# Patient Record
Sex: MALE | Race: WHITE
[De-identification: names, ages, dates, MRNs, and addresses within clinical notes are randomized per-mention and may not be internally consistent; named-entity substitution may affect disease eponyms.]

---

## 2020-02-12 ENCOUNTER — HOSPITAL ENCOUNTER (INPATIENT)
Dept: HOSPITAL 72 - EMR | Age: 85
LOS: 5 days | Discharge: NURSING, REHAB OR LONG-TERM CARE | DRG: 871 | End: 2020-02-17
Payer: MEDICARE

## 2020-02-12 VITALS — DIASTOLIC BLOOD PRESSURE: 54 MMHG | SYSTOLIC BLOOD PRESSURE: 96 MMHG

## 2020-02-12 VITALS — DIASTOLIC BLOOD PRESSURE: 48 MMHG | SYSTOLIC BLOOD PRESSURE: 98 MMHG

## 2020-02-12 VITALS — SYSTOLIC BLOOD PRESSURE: 112 MMHG | DIASTOLIC BLOOD PRESSURE: 54 MMHG

## 2020-02-12 VITALS — DIASTOLIC BLOOD PRESSURE: 43 MMHG | SYSTOLIC BLOOD PRESSURE: 100 MMHG

## 2020-02-12 VITALS — SYSTOLIC BLOOD PRESSURE: 88 MMHG | DIASTOLIC BLOOD PRESSURE: 42 MMHG

## 2020-02-12 VITALS — DIASTOLIC BLOOD PRESSURE: 41 MMHG | SYSTOLIC BLOOD PRESSURE: 85 MMHG

## 2020-02-12 VITALS — SYSTOLIC BLOOD PRESSURE: 125 MMHG | DIASTOLIC BLOOD PRESSURE: 75 MMHG

## 2020-02-12 VITALS — SYSTOLIC BLOOD PRESSURE: 101 MMHG | DIASTOLIC BLOOD PRESSURE: 48 MMHG

## 2020-02-12 VITALS — HEIGHT: 71 IN | BODY MASS INDEX: 28 KG/M2 | WEIGHT: 200 LBS

## 2020-02-12 VITALS — SYSTOLIC BLOOD PRESSURE: 90 MMHG | DIASTOLIC BLOOD PRESSURE: 59 MMHG

## 2020-02-12 VITALS — SYSTOLIC BLOOD PRESSURE: 92 MMHG | DIASTOLIC BLOOD PRESSURE: 50 MMHG

## 2020-02-12 VITALS — DIASTOLIC BLOOD PRESSURE: 40 MMHG | SYSTOLIC BLOOD PRESSURE: 98 MMHG

## 2020-02-12 VITALS — DIASTOLIC BLOOD PRESSURE: 39 MMHG | SYSTOLIC BLOOD PRESSURE: 91 MMHG

## 2020-02-12 VITALS — DIASTOLIC BLOOD PRESSURE: 51 MMHG | SYSTOLIC BLOOD PRESSURE: 102 MMHG

## 2020-02-12 VITALS — DIASTOLIC BLOOD PRESSURE: 65 MMHG | SYSTOLIC BLOOD PRESSURE: 115 MMHG

## 2020-02-12 VITALS — SYSTOLIC BLOOD PRESSURE: 93 MMHG | DIASTOLIC BLOOD PRESSURE: 50 MMHG

## 2020-02-12 VITALS — DIASTOLIC BLOOD PRESSURE: 65 MMHG | SYSTOLIC BLOOD PRESSURE: 112 MMHG

## 2020-02-12 VITALS — SYSTOLIC BLOOD PRESSURE: 109 MMHG | DIASTOLIC BLOOD PRESSURE: 44 MMHG

## 2020-02-12 VITALS — SYSTOLIC BLOOD PRESSURE: 110 MMHG | DIASTOLIC BLOOD PRESSURE: 50 MMHG

## 2020-02-12 VITALS — SYSTOLIC BLOOD PRESSURE: 84 MMHG | DIASTOLIC BLOOD PRESSURE: 43 MMHG

## 2020-02-12 DIAGNOSIS — N17.9: ICD-10-CM

## 2020-02-12 DIAGNOSIS — E86.0: ICD-10-CM

## 2020-02-12 DIAGNOSIS — I49.5: ICD-10-CM

## 2020-02-12 DIAGNOSIS — J44.0: ICD-10-CM

## 2020-02-12 DIAGNOSIS — N18.9: ICD-10-CM

## 2020-02-12 DIAGNOSIS — I21.A1: ICD-10-CM

## 2020-02-12 DIAGNOSIS — Z95.0: ICD-10-CM

## 2020-02-12 DIAGNOSIS — A41.9: Primary | ICD-10-CM

## 2020-02-12 DIAGNOSIS — J96.01: ICD-10-CM

## 2020-02-12 DIAGNOSIS — Z95.5: ICD-10-CM

## 2020-02-12 DIAGNOSIS — I25.10: ICD-10-CM

## 2020-02-12 DIAGNOSIS — Z95.1: ICD-10-CM

## 2020-02-12 DIAGNOSIS — J15.1: ICD-10-CM

## 2020-02-12 DIAGNOSIS — R62.7: ICD-10-CM

## 2020-02-12 DIAGNOSIS — F03.90: ICD-10-CM

## 2020-02-12 DIAGNOSIS — E11.22: ICD-10-CM

## 2020-02-12 DIAGNOSIS — I13.10: ICD-10-CM

## 2020-02-12 DIAGNOSIS — J44.1: ICD-10-CM

## 2020-02-12 DIAGNOSIS — D64.9: ICD-10-CM

## 2020-02-12 DIAGNOSIS — I73.9: ICD-10-CM

## 2020-02-12 DIAGNOSIS — R63.0: ICD-10-CM

## 2020-02-12 DIAGNOSIS — R13.10: ICD-10-CM

## 2020-02-12 LAB
ADD MANUAL DIFF: YES
ADD MANUAL DIFF: YES
ALBUMIN SERPL-MCNC: 2.3 G/DL (ref 3.4–5)
ALBUMIN/GLOB SERPL: 0.5 {RATIO} (ref 1–2.7)
ALP SERPL-CCNC: 104 U/L (ref 46–116)
ALT SERPL-CCNC: 19 U/L (ref 12–78)
ANION GAP SERPL CALC-SCNC: 6 MMOL/L (ref 5–15)
ANION GAP SERPL CALC-SCNC: 9 MMOL/L (ref 5–15)
APPEARANCE UR: CLEAR
APTT PPP: YELLOW S
AST SERPL-CCNC: 21 U/L (ref 15–37)
BILIRUB SERPL-MCNC: 0.5 MG/DL (ref 0.2–1)
BUN SERPL-MCNC: 39 MG/DL (ref 7–18)
BUN SERPL-MCNC: 43 MG/DL (ref 7–18)
CALCIUM SERPL-MCNC: 8.3 MG/DL (ref 8.5–10.1)
CALCIUM SERPL-MCNC: 8.8 MG/DL (ref 8.5–10.1)
CHLORIDE SERPL-SCNC: 105 MMOL/L (ref 98–107)
CHLORIDE SERPL-SCNC: 108 MMOL/L (ref 98–107)
CO2 SERPL-SCNC: 23 MMOL/L (ref 21–32)
CO2 SERPL-SCNC: 29 MMOL/L (ref 21–32)
CREAT SERPL-MCNC: 2.2 MG/DL (ref 0.55–1.3)
CREAT SERPL-MCNC: 2.8 MG/DL (ref 0.55–1.3)
ERYTHROCYTE [DISTWIDTH] IN BLOOD BY AUTOMATED COUNT: 16.4 % (ref 11.6–14.8)
ERYTHROCYTE [DISTWIDTH] IN BLOOD BY AUTOMATED COUNT: 16.8 % (ref 11.6–14.8)
GLOBULIN SER-MCNC: 4.8 G/DL
GLUCOSE UR STRIP-MCNC: NEGATIVE MG/DL
HCT VFR BLD CALC: 32.3 % (ref 42–52)
HCT VFR BLD CALC: 34.5 % (ref 42–52)
HGB BLD-MCNC: 10.2 G/DL (ref 14.2–18)
HGB BLD-MCNC: 11.6 G/DL (ref 14.2–18)
KETONES UR QL STRIP: (no result)
LEUKOCYTE ESTERASE UR QL STRIP: NEGATIVE
MCV RBC AUTO: 92 FL (ref 80–99)
MCV RBC AUTO: 94 FL (ref 80–99)
NITRITE UR QL STRIP: NEGATIVE
PH UR STRIP: 5 [PH] (ref 4.5–8)
PLATELET # BLD: 174 K/UL (ref 150–450)
PLATELET # BLD: 247 K/UL (ref 150–450)
POTASSIUM SERPL-SCNC: 4.4 MMOL/L (ref 3.5–5.1)
POTASSIUM SERPL-SCNC: 4.5 MMOL/L (ref 3.5–5.1)
PROT UR QL STRIP: (no result)
RBC # BLD AUTO: 3.44 M/UL (ref 4.7–6.1)
RBC # BLD AUTO: 3.73 M/UL (ref 4.7–6.1)
SODIUM SERPL-SCNC: 140 MMOL/L (ref 136–145)
SODIUM SERPL-SCNC: 140 MMOL/L (ref 136–145)
SP GR UR STRIP: 1.02 (ref 1–1.03)
UROBILINOGEN UR-MCNC: NORMAL MG/DL (ref 0–1)
WBC # BLD AUTO: 13.2 K/UL (ref 4.8–10.8)
WBC # BLD AUTO: 19.9 K/UL (ref 4.8–10.8)

## 2020-02-12 PROCEDURE — 85007 BL SMEAR W/DIFF WBC COUNT: CPT

## 2020-02-12 PROCEDURE — 87040 BLOOD CULTURE FOR BACTERIA: CPT

## 2020-02-12 PROCEDURE — 82962 GLUCOSE BLOOD TEST: CPT

## 2020-02-12 PROCEDURE — 71045 X-RAY EXAM CHEST 1 VIEW: CPT

## 2020-02-12 PROCEDURE — 82803 BLOOD GASES ANY COMBINATION: CPT

## 2020-02-12 PROCEDURE — 84484 ASSAY OF TROPONIN QUANT: CPT

## 2020-02-12 PROCEDURE — 87181 SC STD AGAR DILUTION PER AGT: CPT

## 2020-02-12 PROCEDURE — 87070 CULTURE OTHR SPECIMN AEROBIC: CPT

## 2020-02-12 PROCEDURE — 96368 THER/DIAG CONCURRENT INF: CPT

## 2020-02-12 PROCEDURE — 93306 TTE W/DOPPLER COMPLETE: CPT

## 2020-02-12 PROCEDURE — 93970 EXTREMITY STUDY: CPT

## 2020-02-12 PROCEDURE — 85025 COMPLETE CBC W/AUTO DIFF WBC: CPT

## 2020-02-12 PROCEDURE — 70450 CT HEAD/BRAIN W/O DYE: CPT

## 2020-02-12 PROCEDURE — 71250 CT THORAX DX C-: CPT

## 2020-02-12 PROCEDURE — 87205 SMEAR GRAM STAIN: CPT

## 2020-02-12 PROCEDURE — 82140 ASSAY OF AMMONIA: CPT

## 2020-02-12 PROCEDURE — 80048 BASIC METABOLIC PNL TOTAL CA: CPT

## 2020-02-12 PROCEDURE — 36415 COLL VENOUS BLD VENIPUNCTURE: CPT

## 2020-02-12 PROCEDURE — 86710 INFLUENZA VIRUS ANTIBODY: CPT

## 2020-02-12 PROCEDURE — 80061 LIPID PANEL: CPT

## 2020-02-12 PROCEDURE — 78580 LUNG PERFUSION IMAGING: CPT

## 2020-02-12 PROCEDURE — 96375 TX/PRO/DX INJ NEW DRUG ADDON: CPT

## 2020-02-12 PROCEDURE — 36600 WITHDRAWAL OF ARTERIAL BLOOD: CPT

## 2020-02-12 PROCEDURE — 78579 LUNG VENTILATION IMAGING: CPT

## 2020-02-12 PROCEDURE — 99285 EMERGENCY DEPT VISIT HI MDM: CPT

## 2020-02-12 PROCEDURE — 80053 COMPREHEN METABOLIC PANEL: CPT

## 2020-02-12 PROCEDURE — 72131 CT LUMBAR SPINE W/O DYE: CPT

## 2020-02-12 PROCEDURE — 94640 AIRWAY INHALATION TREATMENT: CPT

## 2020-02-12 PROCEDURE — 85379 FIBRIN DEGRADATION QUANT: CPT

## 2020-02-12 PROCEDURE — 74018 RADEX ABDOMEN 1 VIEW: CPT

## 2020-02-12 PROCEDURE — 81003 URINALYSIS AUTO W/O SCOPE: CPT

## 2020-02-12 PROCEDURE — 83880 ASSAY OF NATRIURETIC PEPTIDE: CPT

## 2020-02-12 PROCEDURE — 96365 THER/PROPH/DIAG IV INF INIT: CPT

## 2020-02-12 PROCEDURE — 72192 CT PELVIS W/O DYE: CPT

## 2020-02-12 PROCEDURE — 87081 CULTURE SCREEN ONLY: CPT

## 2020-02-12 PROCEDURE — 93005 ELECTROCARDIOGRAM TRACING: CPT

## 2020-02-12 RX ADMIN — INSULIN ASPART SCH UNITS: 100 INJECTION, SOLUTION INTRAVENOUS; SUBCUTANEOUS at 11:30

## 2020-02-12 RX ADMIN — IPRATROPIUM BROMIDE AND ALBUTEROL SULFATE SCH ML: .5; 3 SOLUTION RESPIRATORY (INHALATION) at 13:00

## 2020-02-12 RX ADMIN — IPRATROPIUM BROMIDE AND ALBUTEROL SULFATE SCH ML: .5; 3 SOLUTION RESPIRATORY (INHALATION) at 19:19

## 2020-02-12 RX ADMIN — ATORVASTATIN CALCIUM SCH MG: 20 TABLET, FILM COATED ORAL at 21:00

## 2020-02-12 RX ADMIN — DEXTROSE MONOHYDRATE SCH MLS/HR: 50 INJECTION, SOLUTION INTRAVENOUS at 17:19

## 2020-02-12 RX ADMIN — HEPARIN SODIUM SCH UNITS: 5000 INJECTION INTRAVENOUS; SUBCUTANEOUS at 10:09

## 2020-02-12 RX ADMIN — DIVALPROEX SODIUM SCH MG: 125 CAPSULE ORAL at 18:00

## 2020-02-12 RX ADMIN — DOCUSATE SODIUM SCH MG: 100 CAPSULE, LIQUID FILLED ORAL at 18:00

## 2020-02-12 RX ADMIN — METHYLPREDNISOLONE SODIUM SUCCINATE SCH MG: 40 INJECTION, POWDER, LYOPHILIZED, FOR SOLUTION INTRAMUSCULAR; INTRAVENOUS at 21:31

## 2020-02-12 RX ADMIN — INSULIN ASPART SCH UNITS: 100 INJECTION, SOLUTION INTRAVENOUS; SUBCUTANEOUS at 21:00

## 2020-02-12 RX ADMIN — DOCUSATE SODIUM SCH MG: 100 CAPSULE, LIQUID FILLED ORAL at 13:00

## 2020-02-12 RX ADMIN — INSULIN ASPART SCH UNITS: 100 INJECTION, SOLUTION INTRAVENOUS; SUBCUTANEOUS at 17:21

## 2020-02-12 RX ADMIN — HEPARIN SODIUM SCH UNITS: 5000 INJECTION INTRAVENOUS; SUBCUTANEOUS at 21:34

## 2020-02-12 NOTE — NUR
HAND-OFF: 

Patient monitored closely throughout this shift. SBP's improved now 115's to 120's. O2 sat 
96-99%. Patient remained lethargic but now able to open eyes briefly and withdraws to pain. 
Son Wally updated re: patients condition and stated he wanted everything done for his 
father. Report given to Donald Martines. Plan of care endorsed.

## 2020-02-12 NOTE — NUR
NURSE NOTES:

Received report from Donald Naik. PAtient arrived to the floor @0800 transported via gurney 
with 2 Staff. PAtient is very lethargic arousable to Deep pain only. Mouth breathing with 
simple mask at 7 L/min. Noted with scattered ronchi/crackles all throughout. Telemetry 
monitor place. Vital signs obtained SBP low 90's. Patient positioned for comfort and call 
placed to DR. Hardwick. Fall precautions in place.

## 2020-02-12 NOTE — DIAGNOSTIC IMAGING REPORT
Indication: Shortness of breath

 

Technique: One view of the chest

 

Comparison: none

 

Findings: There is a left chest pacemaker. There are median sternotomy sutures. The

left lateral hemidiaphragm is obscured. The heart is upper limits normal in size.

Area of sclerosis is seen in the proximal right humerus.

 

Impression: The posterior left lateral hemidiaphragm, parenchymal and/or pleural

disease of the left lung base possible.

 

No acute process otherwise

 

Sclerotic lesion of the proximal humerus, most likely a bone island, less likely

osteoblastic metastasis

## 2020-02-12 NOTE — NUR
NURSE NOTES:

Dr. Gonzalez made aware of ST recommendations to insert NGT. MD agreed recommendations.

## 2020-02-12 NOTE — NUR
NURSE NOTES:

Dr. Aguirre in to see patient. order received STAT ABG. resulted messaged left to MD. Will 
monitor

## 2020-02-12 NOTE — NUR
NURSE NOTES:

Dr. Fuentes seen patient aware patients B/P new ordered obned to give addiotional dose of 500 
ml IV bolus of saline. B/p Improving. Will follow.

## 2020-02-12 NOTE — NUR
NURSE NOTES:

Per Doctor Ronen he spoke with doctor cruz and KELLY coon will placed NGT in MD. will 
follow

## 2020-02-12 NOTE — CARDIAC ELECTROPHYSIOLOGY PN
Subjective


Subjective


[id number]





Objective





Last 24 Hour Vital Signs








  Date Time  Temp Pulse Resp B/P (MAP) Pulse Ox O2 Delivery O2 Flow Rate FiO2


 


2/12/20 08:05 98.5 79 21 112/54 98 Simple Mask 7.0 


 


2/12/20 07:30 98.5 79 20 112/54 98 Simple Mask 7.0 


 


2/12/20 06:39 98.5 78 20 109/44 100 Simple Mask 15.0 


 


2/12/20 04:00 99.1 99 22 110/50 97 Non-Rebreather 15.0 


 


2/12/20 04:00  99 22   Non-Rebreather 15.0 


 


2/12/20 03:52 98.8 99 22 110/50 (70) 83 Room Air  

















Intake and Output  


 


 2/11/20 2/12/20





 19:00 07:00


 


Intake Total  2660 ml


 


Balance  2660 ml


 


  


 


Intake IV Total  2660 ml


 


# Voids  1











Laboratory Tests








Test


  2/12/20


04:08 2/12/20


04:24 2/12/20


04:44


 


Arterial Blood pH


  7.470


(7.350-7.450) 


  


 


 


Arterial Blood Partial


Pressure CO2 30.2 mmHg


(35.0-45.0)  L 


  


 


 


Arterial Blood Partial


Pressure O2 120.3 mmHg


(75.0-100.0)  H 


  


 


 


Arterial Blood HCO3


  21.6 mmol/L


(22.0-26.0)  L 


  


 


 


Arterial Blood Oxygen


Saturation 98.5 %


() 


  


 


 


Arterial Blood Base Excess -1.2 (-2-2)    


 


Shawn Test Positive    


 


White Blood Count


  


  19.9 K/UL


(4.8-10.8)  H 


 


 


Red Blood Count


  


  3.73 M/UL


(4.70-6.10)  L 


 


 


Hemoglobin


  


  11.6 G/DL


(14.2-18.0)  L 


 


 


Hematocrit


  


  34.5 %


(42.0-52.0)  L 


 


 


Mean Corpuscular Volume  92 FL (80-99)   


 


Mean Corpuscular Hemoglobin


  


  31.2 PG


(27.0-31.0)  H 


 


 


Mean Corpuscular Hemoglobin


Concent 


  33.7 G/DL


(32.0-36.0) 


 


 


Red Cell Distribution Width


  


  16.4 %


(11.6-14.8)  H 


 


 


Platelet Count


  


  247 K/UL


(150-450) 


 


 


Mean Platelet Volume


  


  5.5 FL


(6.5-10.1)  L 


 


 


Neutrophils (%) (Auto)


  


  % (45.0-75.0)


  


 


 


Lymphocytes (%) (Auto)


  


  % (20.0-45.0)


  


 


 


Monocytes (%) (Auto)   % (1.0-10.0)   


 


Eosinophils (%) (Auto)   % (0.0-3.0)   


 


Basophils (%) (Auto)   % (0.0-2.0)   


 


Differential Total Cells


Counted 


  100  


  


 


 


Neutrophils % (Manual)  80 % (45-75)  H 


 


Lymphocytes % (Manual)  15 % (20-45)  L 


 


Monocytes % (Manual)  5 % (1-10)   


 


Eosinophils % (Manual)  0 % (0-3)   


 


Basophils % (Manual)  0 % (0-2)   


 


Band Neutrophils  0 % (0-8)   


 


Platelet Estimate  Adequate   


 


Platelet Morphology  Normal   


 


Sodium Level


  


  140 MMOL/L


(136-145) 


 


 


Potassium Level


  


  4.5 MMOL/L


(3.5-5.1) 


 


 


Chloride Level


  


  105 MMOL/L


() 


 


 


Carbon Dioxide Level


  


  29 MMOL/L


(21-32) 


 


 


Anion Gap


  


  6 mmol/L


(5-15) 


 


 


Blood Urea Nitrogen


  


  39 mg/dL


(7-18)  H 


 


 


Creatinine


  


  2.8 MG/DL


(0.55-1.30)  H 


 


 


Estimat Glomerular Filtration


Rate 


  21.4 mL/min


(>60) 


 


 


Glucose Level


  


  127 MG/DL


()  H 


 


 


Calcium Level


  


  8.8 MG/DL


(8.5-10.1) 


 


 


Total Bilirubin


  


  0.5 MG/DL


(0.2-1.0) 


 


 


Aspartate Amino Transf


(AST/SGOT) 


  21 U/L (15-37)


  


 


 


Alanine Aminotransferase


(ALT/SGPT) 


  19 U/L (12-78)


  


 


 


Alkaline Phosphatase


  


  104 U/L


() 


 


 


Troponin I


  


  0.066 ng/mL


(0.000-0.056) 


 


 


Pro-B-Type Natriuretic Peptide


  


  2241 pg/mL


(0-125)  H 


 


 


Total Protein


  


  7.1 G/DL


(6.4-8.2) 


 


 


Albumin


  


  2.3 G/DL


(3.4-5.0)  L 


 


 


Globulin  4.8 g/dL   


 


Albumin/Globulin Ratio


  


  0.5 (1.0-2.7)


L 


 


 


Urine Color   Yellow  


 


Urine Appearance   Clear  


 


Urine pH   5 (4.5-8.0)  


 


Urine Specific Gravity


  


  


  1.020


(1.005-1.035)


 


Urine Protein


  


  


  2+ (NEGATIVE)


H


 


Urine Glucose (UA)


  


  


  Negative


(NEGATIVE)


 


Urine Ketones


  


  


  1+ (NEGATIVE)


H


 


Urine Blood


  


  


  1+ (NEGATIVE)


H


 


Urine Nitrite


  


  


  Negative


(NEGATIVE)


 


Urine Bilirubin


  


  


  1+ (NEGATIVE)


H


 


Urine Ictotest


  


  


  Negative


(NEGATIVE)


 


Urine Urobilinogen


  


  


  Normal MG/DL


(0.0-1.0)


 


Urine Leukocyte Esterase


  


  


  Negative


(NEGATIVE)


 


Urine RBC


  


  


  0-2 /HPF (0 -


0)  H


 


Urine WBC


  


  


  0 /HPF (0 - 0)


 


 


Urine Squamous Epithelial


Cells 


  


  Few /LPF


(NONE/OCC)


 


Urine Bacteria


  


  


  None /HPF


(NONE)











Microbiology








 Date/Time


Source Procedure


Growth Status


 


 


 2/12/20 04:24


Nasal Nares - Final Complete


 


 2/12/20 04:24


Nasal Nares - Final Complete


 


 2/12/20 04:00


Rectum  Received

















Bob Fuentes MD Feb 12, 2020 09:40

## 2020-02-12 NOTE — NUR
NURSE NOTES:

Dr. Hardwick in to see patient admission orders obtained and carried out. Md aware patient 
mental status and stated it moght be due to the ativan that he received from ER. 250ml 
saline bolus given as ordered by MD.

## 2020-02-12 NOTE — NUR
CASE MANAGEMENT:REVIEW



89YR OLD MALE BIBA FROM [facility]



CC: SOB. 



SI: RESPIRATORY FAILURE. JOAQUIN

99.1   99  22  109/44  83% ON RA

WBC+19.9    BUN+39   CR+2.8   TROPONIN(+) 0.066





IS: PLACED ON NON REBREATHER

IV SOLUMEDROL 

1L NS BOLUS

IV ROCEPHIN 

IV ATIVAN

CHEST XRAY

**: TO TELEMETRY 

DCP: FROM [facility]



**interqual criteria met

## 2020-02-12 NOTE — CONSULTATION
DATE OF CONSULTATION:  02/12/2020

PULMONARY CONSULTATION



CONSULTING PHYSICIAN:  Mika Aguirre M.D.



REFERRING PHYSICIAN:  Kandy Hardwick M.D.



REASON FOR CONSULTATION:  COPD exacerbation.



HISTORY OF PRESENT ILLNESS:  The patient is an 89-year-old male with a

presumed past medical history of COPD, dementia, arrhythmia, sick sinus

syndrome and pacemaker, and recent admission at [facility] with sepsis

and possible pneumonia, nonverbal at baseline with severe dementia, sent

in from the facility with hypoxemia and low O2 sats.  He was 83% on room

air in the ER with leukocytosis.  Chest x-ray did not show any acute

abnormality.  He was treated with Rocephin and azithromycin and admitted.

At the time of my history, the patient is obtunded, but per nurse he

received Ativan in the ER and was more responsive earlier.  An ABG is

pending.



PAST MEDICAL HISTORY:

1. COPD.

2. Dementia.

3. Arrhythmia.

4. Sick sinus syndrome, status post pacemaker.



PAST SURGICAL HISTORY:  Pacemaker placement, otherwise unknown.



SOCIAL HISTORY:  Retired, nursing home resident.  No known tobacco,

alcohol, or drug use.



ALLERGIES:  Acetaminophen and hydrocodone.



MEDICATIONS:  Prior to admission medications, reviewed.  Current

medications, reviewed.



REVIEW OF SYSTEMS:  Unobtainable.



PHYSICAL EXAMINATION:

VITAL SIGNS:  Temperature is 98.8, pulse 85, blood pressure 98/54, and

respiratory rate 16.  Saturating 98% on 8 L.

GENERAL:  He is an elderly frail gentleman, in no acute distress,

nonverbal.

HEENT:  Normocephalic and atraumatic.  Oropharynx is clear.  Moist mucous

membranes.

NECK:  Supple without lymphadenopathy.

CHEST:  Coarse breath sounds.  Distant.

HEART:  Regular rate and rhythm.

ABDOMEN:  Soft, nontender, and nondistended.

EXTREMITIES:  No cyanosis clubbing, or edema.



ANCILLARY DATA:  White count 13.2, hemoglobin 10, and platelet count

174,000.  ABG - 7.33/39/49/20/82.  Sodium 140, potassium 4.4, chloride

108, bicarbonate 23, BUN 42, and creatinine 2.2.  Glucose 217.  Calcium

8.3.  Troponin 0.06 and 0.08.  BNP 1688.  Total protein 7.1, albumin 2.3,

globulin 4.8.  Please note that creatinine was 1 on 01/30/2020 at

[facility].



Last echocardiogram on 11/02/2019 at [facility] showed LVEF of 38%

with mild diastolic dysfunction.



IMAGING:  Chest x-ray showed some atelectasis at the bases, bony findings,

and the senescent changes.



CT of the chest without contrast showed bilateral basilar infiltrates

and atelectasis.  Mild congestion.  Old granulomatous disease.  Pacemaker.

Prior sternotomy.  Cholelithiasis.  Constipation.



ASSESSMENT:  The patient is an 89-year-old male with a history of dementia,

chronic obstructive pulmonary disease, prior sternotomy, congestive heart

failure, and sick sinus syndrome, status post pacemaker, presenting with a

respiratory illness, likely healthcare-associated pneumonia with acute

kidney injury and dehydration.



PROBLEM LIST:

1. Healthcare-associated pneumonia.

2. Acute hypoxemic respiratory failure secondary to above.

3. Acute kidney injury.

4. Dehydration.

5. Non ST-elevation myocardial infarction, likely demand ischemia.

6. Chronic obstructive pulmonary disease with acute exacerbation.

7. Hypertension.

8. Hypertensive heart disease.

9. Hyperlipidemia.

10. Diabetes type 2.

11. CAD, status post CABG and PCI.

12. History of Mobitz type II heart block and sick sinus syndrome,

status post pacemaker.

13. Peripheral vascular disease.

14. History of angioedema in the past.

15. Dementia.



TREATMENT PLAN:

1. Optimize pulmonary hygiene/mobilize as tolerated.

2. Titrate down FiO2 to keep saturations greater than 90%.

3. Round-the-clock and p.r.n. bronchodilators.

4. Solu-Medrol 40 mg IV b.i.d. and taper.

5. Antibiotics (Zosyn) per ID.

6. Monitor volumes and renal function, we will consider IV fluid

hydration if the patient is clinically dehydrated.

7. Aspiration precautions, n.p.o., swallow evaluation when more alert.

8. DVT prophylaxis, heparin subcutaneous.

9. The patient is Full Code, continue to discuss goals of care.



Dr. Hardwick, thank you for allowing me to assist in the care of your

patient.  If I may be of any assistance in the future, please do not

hesitate to ask.









  ______________________________________________

  Mika Aguirre M.D.





DR:  TAZ

D:  02/12/2020 18:09

T:  02/12/2020 21:08

JOB#:  [id number]

CC:

## 2020-02-12 NOTE — NUR
TRANSFER TO FLOOR:



Patient transferred to TELEMETRY as ordered, per Dr. Hardwick. Report given 
to Kimi RN. Belonging list and medication list given to receiving RN. Family and 
or S/O informed of transfer.

## 2020-02-12 NOTE — NUR
NURSE NOTES:

Received report from Lyndsay VILLALOBOS RN. Pt in bed and in stable condition. No signs of symptoms of 
pain or distress noted. Bed alarm armed, bed in lowest position, call light within reach. 
Will continue plan of care and close monitoring.

## 2020-02-12 NOTE — EMERGENCY ROOM REPORT
History of Present Illness


General


Chief Complaint:  Dyspnea/Respdistress


Source:  EMS





Present Illness


HPI


Disclaimer: Please note that this report is being documented using DRAGON 

technology. This can lead to erroneous entry secondary to incorrect 

interpretation by the dictating instrument.





HPI: 89-year-old male with a history of severe dementia, COPD presents from 

skilled nursing facility for evaluation of hypoxia.  On morning rounds he was 

found to be hypoxic with oxygen saturations in the low 80s.  They report 

intermittent cough.  No fevers were reported.  Patient is nonverbal at baseline 

with severe dementia.  EMS states he was somnolent en route.  They gave him 1 

breathing treatment without significant improvement.  He does improve with 

additional oxygen to the low 90s.  Accompanying paperwork states he is full 

code.


 


PMH: Dementia, COPD


 


PSH: Pacemaker


 


Allergies: Codeine, Tylenol


 


Social Hx: Unable to obtain


Allergies:  


Coded Allergies:  


     ACETAMINOPHEN (Verified  Allergy, Unknown, 2/12/20)


     HYDROCODONE (Verified  Allergy, Unknown, 2/12/20)





Review of Systems


All Other Systems:  limited - Unable to obtain from patient due to clinical 

condition





Physical Exam





Vital Signs








  Date Time  Temp Pulse Resp B/P (MAP) Pulse Ox O2 Delivery O2 Flow Rate FiO2


 


2/12/20 03:52 98.8 99 22 110/50 (70) 83 Room Air  





 





General: Somnolent.  Arousable to noxious stimuli.  Hypoxic on room air


HEENT: NC/AT. Edentulous.  Dry mucous membranes


Chest wall: Pacemaker palpable in left chest wall.  


Cardiovascular: RRR.  S1 and S2 normal.  No murmur appreciated


Resp: Mild tachypnea.  Hypoxic at 83% on room air.  Coarse crackles 

bilaterally.  No wheezing.


Abdomen: Abdomen is soft, nondistended.  Nontender


Skin: Intact.  No abrasions, laceration or rash over the exposed skin.  

Surgical scars over the chest are clean dry and intact.


MSK: Normal tone and bulk. Moving all extremities.  No obvious deformity.


Neuro: Somnolent.  Arousable to noxious stimuli.  GCS 9 - E2, V2, M5





Medical Decision Making


Diagnostic Impression:  


 Primary Impression:  


 Respiratory failure with hypoxia


 Additional Impressions:  


 JOAQUIN (acute kidney injury)


 Elevated WBC count


ER Course


89-year-old male presents from a skilled nursing facility for evaluation of 

hypoxia.  Patient remains hypoxic on room air but improves with nonrebreather.  

Vital signs otherwise within normal limits and he is afebrile. Crackles 

bilaterally but no wheezes.  He did receive 1 breathing treatment en route.  

Will give steroids and maintain on oxygen.  Will send labs including blood gas.

  Will order antibiotics given the patient's COPD status.  Patient will require 

admission.





Laboratory Tests








Test


  2/12/20


04:08 2/12/20


04:24 2/12/20


04:44


 


Arterial Blood pH


  7.470


(7.350-7.450) 


  


 


 


Arterial Blood Partial


Pressure CO2 30.2 mmHg


(35.0-45.0)  L 


  


 


 


Arterial Blood Partial


Pressure O2 120.3 mmHg


(75.0-100.0)  H 


  


 


 


Arterial Blood HCO3


  21.6 mmol/L


(22.0-26.0)  L 


  


 


 


Arterial Blood Oxygen


Saturation 98.5 %


() 


  


 


 


Arterial Blood Base Excess -1.2 (-2-2)    


 


Shawn Test Positive    


 


White Blood Count


  


  19.9 K/UL


(4.8-10.8)  H 


 


 


Red Blood Count


  


  3.73 M/UL


(4.70-6.10)  L 


 


 


Hemoglobin


  


  11.6 G/DL


(14.2-18.0)  L 


 


 


Hematocrit


  


  34.5 %


(42.0-52.0)  L 


 


 


Mean Corpuscular Volume  92 FL (80-99)   


 


Mean Corpuscular Hemoglobin


  


  31.2 PG


(27.0-31.0)  H 


 


 


Mean Corpuscular Hemoglobin


Concent 


  33.7 G/DL


(32.0-36.0) 


 


 


Red Cell Distribution Width


  


  16.4 %


(11.6-14.8)  H 


 


 


Platelet Count


  


  247 K/UL


(150-450) 


 


 


Mean Platelet Volume


  


  5.5 FL


(6.5-10.1)  L 


 


 


Neutrophils (%) (Auto)


  


  % (45.0-75.0)


  


 


 


Lymphocytes (%) (Auto)


  


  % (20.0-45.0)


  


 


 


Monocytes (%) (Auto)   % (1.0-10.0)   


 


Eosinophils (%) (Auto)   % (0.0-3.0)   


 


Basophils (%) (Auto)   % (0.0-2.0)   


 


Differential Total Cells


Counted 


  100  


  


 


 


Neutrophils % (Manual)  80 % (45-75)  H 


 


Lymphocytes % (Manual)  15 % (20-45)  L 


 


Monocytes % (Manual)  5 % (1-10)   


 


Eosinophils % (Manual)  0 % (0-3)   


 


Basophils % (Manual)  0 % (0-2)   


 


Band Neutrophils  0 % (0-8)   


 


Platelet Estimate  Adequate   


 


Platelet Morphology  Normal   


 


Sodium Level


  


  140 MMOL/L


(136-145) 


 


 


Potassium Level


  


  4.5 MMOL/L


(3.5-5.1) 


 


 


Chloride Level


  


  105 MMOL/L


() 


 


 


Carbon Dioxide Level


  


  29 MMOL/L


(21-32) 


 


 


Anion Gap


  


  6 mmol/L


(5-15) 


 


 


Blood Urea Nitrogen


  


  39 mg/dL


(7-18)  H 


 


 


Creatinine


  


  2.8 MG/DL


(0.55-1.30)  H 


 


 


Estimate Glomerular


Filtration Rate 


  21.4 mL/min


(>60) 


 


 


Glucose Level


  


  127 MG/DL


()  H 


 


 


Calcium Level


  


  8.8 MG/DL


(8.5-10.1) 


 


 


Total Bilirubin


  


  0.5 MG/DL


(0.2-1.0) 


 


 


Aspartate Amino Transferase


(AST) 


  21 U/L (15-37)


  


 


 


Alanine Aminotransferase (ALT)


  


  19 U/L (12-78)


  


 


 


Alkaline Phosphatase


  


  104 U/L


() 


 


 


Troponin I


  


  0.066 ng/mL


(0.000-0.056) 


 


 


Pro-B-Type Natriuretic Peptide


  


  2241 pg/mL


(0-125)  H 


 


 


Total Protein


  


  7.1 G/DL


(6.4-8.2) 


 


 


Albumin


  


  2.3 G/DL


(3.4-5.0)  L 


 


 


Globulin  4.8 g/dL   


 


Albumin/Globulin Ratio


  


  0.5 (1.0-2.7)


L 


 


 


Urine Color   Yellow  


 


Urine Appearance   Clear  


 


Urine pH   5 (4.5-8.0)  


 


Urine Specific Gravity


  


  


  1.020


(1.005-1.035)


 


Urine Protein


  


  


  2+ (NEGATIVE)


H


 


Urine Glucose (UA)


  


  


  Negative


(NEGATIVE)


 


Urine Ketones


  


  


  1+ (NEGATIVE)


H


 


Urine Blood


  


  


  1+ (NEGATIVE)


H


 


Urine Nitrite


  


  


  Negative


(NEGATIVE)


 


Urine Bilirubin


  


  


  1+ (NEGATIVE)


H


 


Urine Ictotest


  


  


  Negative


(NEGATIVE)


 


Urine Urobilinogen


  


  


  Normal MG/DL


(0.0-1.0)


 


Urine Leukocyte Esterase


  


  


  Negative


(NEGATIVE)


 


Urine RBC


  


  


  0-2 /HPF (0 -


0)  H


 


Urine WBC


  


  


  0 /HPF (0 - 0)


 


 


Urine Squamous Epithelial


Cells 


  


  Few /LPF


(NONE/OCC)


 


Urine Bacteria


  


  


  None /HPF


(NONE)








Microbiology








 Date/Time


Source Procedure


Growth Status


 


 


 2/12/20 04:24


Nasal Nares - Final Complete


 


 2/12/20 04:24


Nasal Nares - Final Complete








EKG Diagnostic Results


EKG Time:  04:01


Rate:  normal


Rhythm:  NSR


Other Impression


Sinus rhythm, left axis deviation, interventricular conduction delay.  There 

are inverted T waves in the anterior and lateral leads.  No obvious ST segment 

changes





Rhythm Strip Diag. Results


Rhythm Strip Time:  04:01


EP Interpretation:  yes


Rate:  80s


Rhythm:  NSR, no PVC's, no ectopy





Chest X-Ray Diagnostic Results


Chest X-Ray Diagnostic Results :  


   Chest X-Ray Ordered:  Yes


   # of Views/Limited/Complete:  1 View


   Indication:  Shortness of Breath


   EP Interpretation:  Yes


   Interpretation:  other - Evidence of prior sternotomy.  No obvious 

infiltrate.  Difficult to fully assess the left costophrenic angle.  Pacemaker 

in place.


   Impression:  Other - No obvious infiltrate.


   Electronically Signed by:  Electronically signed by Dr. Maicol Wright


Reevaluation Time:  05:13





Last Vital Signs








  Date Time  Temp Pulse Resp B/P (MAP) Pulse Ox O2 Delivery O2 Flow Rate FiO2


 


2/12/20 03:52 98.8 99 22 110/50 (70) 83 Room Air  








Reevaluation Impression


Labs show elevated white count with neutrophil predominance.  Antibiotics 

ordered.  Blood gas shows hypoxemia.  Will decrease oxygen flow.  PCO2 low.  No 

acidosis.  Chemistry consistent with acute kidney injury.  Troponin slightly 

above upper limit of normal at 0.06.  EKG shows diffuse T wave inversions but 

no ST segment elevation.  We will continue to trend.  Will admit the patient to 

telemetry.


Disposition:  ADMITTED AS INPATIENT


Condition:  Serious











Maicol Wright MD Feb 12, 2020 04:00

## 2020-02-12 NOTE — GENERAL PROGRESS NOTE
Subjective


Date patient seen:  Feb 12, 2020


Time patient seen:  08:40


Allergies:  


Coded Allergies:  


     ACETAMINOPHEN (Verified  Allergy, Unknown, 2/12/20)


     HYDROCODONE (Verified  Allergy, Unknown, 2/12/20)


Subjective


Full dictation to follow.





Objective





Last 24 Hour Vital Signs








  Date Time  Temp Pulse Resp B/P (MAP) Pulse Ox O2 Delivery O2 Flow Rate FiO2


 


2/12/20 08:05 98.5 79 21 112/54 98 Simple Mask 7.0 


 


2/12/20 07:30 98.5 79 20 112/54 98 Simple Mask 7.0 


 


2/12/20 06:39 98.5 78 20 109/44 100 Simple Mask 15.0 


 


2/12/20 04:00 99.1 99 22 110/50 97 Non-Rebreather 15.0 


 


2/12/20 04:00  99 22   Non-Rebreather 15.0 


 


2/12/20 03:52 98.8 99 22 110/50 (70) 83 Room Air  

















Intake and Output  


 


 2/11/20 2/12/20





 19:00 07:00


 


Intake Total  2660 ml


 


Balance  2660 ml


 


  


 


Intake IV Total  2660 ml


 


# Voids  1








Laboratory Tests


2/12/20 04:08: 


Arterial Blood pH 7.470H, Arterial Blood Partial Pressure CO2 30.2L, Arterial 

Blood Partial Pressure O2 120.3H, Arterial Blood HCO3 21.6L, Arterial Blood 

Oxygen Saturation 98.5, Arterial Blood Base Excess -1.2, Shawn Test Positive


2/12/20 04:24: 


White Blood Count 19.9H, Red Blood Count 3.73L, Hemoglobin 11.6L, Hematocrit 

34.5L, Mean Corpuscular Volume 92, Mean Corpuscular Hemoglobin 31.2H, Mean 

Corpuscular Hemoglobin Concent 33.7, Red Cell Distribution Width 16.4H, 

Platelet Count 247, Mean Platelet Volume 5.5L, Neutrophils (%) (Auto) , 

Lymphocytes (%) (Auto) , Monocytes (%) (Auto) , Eosinophils (%) (Auto) , 

Basophils (%) (Auto) , Differential Total Cells Counted 100, Neutrophils % (

Manual) 80H, Lymphocytes % (Manual) 15L, Monocytes % (Manual) 5, Eosinophils % (

Manual) 0, Basophils % (Manual) 0, Band Neutrophils 0, Platelet Estimate 

Adequate, Platelet Morphology Normal, Sodium Level 140, Potassium Level 4.5, 

Chloride Level 105, Carbon Dioxide Level 29, Anion Gap 6, Blood Urea Nitrogen 

39H, Creatinine 2.8H, Estimat Glomerular Filtration Rate 21.4, Glucose Level 

127H, Calcium Level 8.8, Total Bilirubin 0.5, Aspartate Amino Transf (AST/SGOT) 

21, Alanine Aminotransferase (ALT/SGPT) 19, Alkaline Phosphatase 104, Troponin 

I 0.066H, Pro-B-Type Natriuretic Peptide 2241H, Total Protein 7.1, Albumin 2.3L

, Globulin 4.8, Albumin/Globulin Ratio 0.5L


2/12/20 04:44: 


Urine Color Yellow, Urine Appearance Clear, Urine pH 5, Urine Specific Gravity 

1.020, Urine Protein 2+H, Urine Glucose (UA) Negative, Urine Ketones 1+H, Urine 

Blood 1+H, Urine Nitrite Negative, Urine Bilirubin 1+H, Urine Ictotest Negative

, Urine Urobilinogen Normal, Urine Leukocyte Esterase Negative, Urine RBC 0-2H, 

Urine WBC 0, Urine Squamous Epithelial Cells Few, Urine Bacteria None


Height (Feet):  5


Height (Inches):  11.00


Weight (Pounds):  200











Kandy Hardwick MD Feb 12, 2020 09:05

## 2020-02-12 NOTE — NUR
ST NOTES:



REFERRED FOR SWALLOW EVAL BY DR HANKINS, SEE REPORT.



DYSPHAGIA RISK FACTORS FOR THIS ADVANCED AGED 89 Y.O.M.:



ACUTE ISSUES:

AMS, RESP FAILURE AND DISTRESS WITH HYPOXIA (SATS TO LOW 80s) AND PLACED ON 

NON-REBREATHER, NOW ON SIMPLE MASK WITH RESP RATE OF 20 BPM, SP02 98%, ON 

8 LITERS OF 02, LUNGS HAVE CRACKLES AND BILATERAL WHEEZE AND HAS 

INTERMITTENT COUGH AND CXR NEG FOR ACUTE PROCESS, ALBUMIN LOW 3.0, JOAQUIN



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 

RECENT MEDS FOR PNA AND PAROTITIS (1/31/20), PSYCH MEDS DEPAKOTE FOR 

LABILE MOOD, DM2, PVD, MI/CARDIAC PACEMAKER, HTN.



AT SNF ON A FORTIFIED PUREED AND NECTAR THICK LIQUID DIET (NO STRAW)ADN 

SEEN BY  FOR DYSPHAGIA MANAGEMENT AND TX. PER POLST OK TO HAVE LONG TERM 

TUBE FEEDINGS IF NEEDS. PATIENT IS NOW NPO EXCEPT ICE CHIPS AND MEDS (NOT GIVEN)



INITIAL IMPRESSIONS



NOT ALERT FOR PO TRIALS NOR MEALS AND MOUTH BREATHING WITH SIMPLE MASK



NO NEED FOR ORAL SUCTION AT THIS TIME



HIGH RISK FOR PERSISTENT OR WORSENED OROPHARYNGEAL DYSPHAGIA



HIGH RISK FOR SILENT ASPIRATION GIVEN ALZHEIMER'S DZ DEMENTIA DX



RECOMMENDATIONS

GIVEN HIS POLST STATES OK TO HAVE TUBE FEEDINGS, CONSIDER TEMPORARY NONORAL FEEDINGS FOR 
MEDS AND FORMULA PER RD (MURPHY CHAVARRIA.



ORAL CARE PRN



WILL COMPLETE MODIFIED BARIUM SWALLOW STUDY WHEN READY TO ASSESS SWALLOW, DETERMINE SILENT 
ASPIRATION RISK/ETIOLOGY, AND ATTEMPT TRIAL TX AND FOR LIKELY ORAL GRAT AND COMFORT FEEDING 
SINCE HE HAS SEVERE DEMENTIA.



SKILLED DYSPHAGIA MANAGEMENT AND TX AND COG-COM EVAL/TX (BASELINE NONVERBAL). 



EDUCATED/TRAINED RN IN POSTED ORAL CARE AND ASP PREC FOR TUBE FEEDINGS WHEN RUNNING.

## 2020-02-12 NOTE — HISTORY AND PHYSICAL REPORT
DATE OF ADMISSION:  02/12/2020

CHIEF COMPLAINT:  Shortness of breath and restlessness at the

rehab.



HISTORY OF PRESENT ILLNESS:  This is an 89-year-old male with history of

dementia, COPD, who was brought in for hypoxia with O2 saturation of low

75 to 80s.  The patient is a long-term resident of [facility].  The

patient reportedly has had cough and shortness of breath at the facility.

The patient is nonverbal and had a history of pacemaker placement

previously.  In the emergency room, the patient received IV antibiotics

and hydration and will be admitted for respiratory failure and COPD

exacerbation with possible sepsis.



PAST MEDICAL HISTORY:  Includes history of pacemaker placement, history of

COPD exacerbation, chronic kidney disease, and advanced dementia.



PAST SURGICAL HISTORY:  History of pacemaker placement previously and

history of CABG.



ALLERGIES:  Shows hydrocodone allergy back in 2020 and acetaminophen

allergy.  The patient was on acetaminophen at the assisted living, so

doubt he has allergy to acetaminophen.



SOCIAL HISTORY:  Unable to talk to the patient because he is nonverbal.



REVIEW OF SYSTEMS:  Again, unable to talk to the patient and nonverbal. 



PHYSICAL EXAMINATION:

VITAL SIGNS:  Includes a temperature of 98.8, pulse 99, respiration 22,

blood pressure 110/50 at the ER, and pulse oximetry 82% on room air.

GENERAL APPEARANCE:  Hypoxic.  The patient is somnolent, but arousable due

to noxious stimuli.

HEENT:  Normocephalic and normochromic.  Extraocular muscles

intact.

LUNGS:  Clear to auscultation bilaterally except for slight rhonchi on Rt side

CARDIOVASCULAR:  Regular rate and rhythm.  No murmur.  No gallop.  Normal

S1, S2.

ABDOMEN:  Soft, nontender, and nondistended.  Positive bowel

sounds.

EXTREMITIES:  No edema, cyanosis, or clubbing.

NEUROLOGIC:  Arousable, but does not respond to commands.



LABORATORY AND DIAGNOSTIC DATA:  Include sodium 140, potassium 4.5,

chloride 105, bicarb 29, BUN 39, creatinine 2.8, glucose 127, calcium 8.8.

AST 21, ALT 19, alkaline phosphatase 104.  Troponin is 0.066 and BNP

2241, albumin 2.3.  WBC 19.9, hemoglobin 11.6, hematocrit 34.5, platelet

count is 247,000, neutrophil is 80, lymphocytes 15.  UA shows +2 urine

protein and +1 urine blood, 0 to 2 rbc's, few squamous epithelial cells.

Chest x-ray showed posterior left lateral hemidiaphragm and parenchymal

and pleural disease of the left lung, but no acute process.  Sclerotic

lesion of the proximal humerus, most likely bone island, less likely

osteoblastic metastasis.



IMPRESSION:

1. Hypoxia.  The patient will be started on breathing treatment, oxygen

and we will also have Pulmonary see the patient today.

2. Acute on chronic kidney disease.  We will monitor renal function and

hydrate the patient with IV fluids to improve the renal aspect of the

acute renal failure.

3. Elevated WBC or leukocytosis, most likely due to possible sepsis,

which we will have ID see the patient and we will upgrade the antibiotic

to Zosyn.

4. COPD exacerbation.  We will restart the patient on nebulizer and

inhaler, and monitor the patient as inpatient.

5. Elevated troponin.  We will have Cardiology, Dr. Fuentes to see the

patient and do a series of troponin level.  Elevated troponin, possibly

due to sepsis and acute on chronic kidney disease.

6. Pacemaker and history of CABG.  We will have Cardiology, Dr. Fuentes

see the patient and evaluate the pacemaker.



The patient will be admitted for minimum of two-night stay for the

diagnoses of COPD exacerbation, elevated troponin, and possible sepsis.









  ______________________________________________

  CARRIE Nash

D:  02/12/2020 16:28

T:  02/12/2020 20:58

JOB#:  [id number]

CC:



WILLIAM

## 2020-02-12 NOTE — INFECTIOUS DISEASES PROG NOTE
Assessment/Plan


Problems:  


(1) COPD (chronic obstructive pulmonary disease)


Assessment & Plan:  with exacerbation , will upgrade his antibiotics to zosyn 

and zyvox and stop ceftriaxone and zithromax empirically , send sputum culture 





(2) Leukocytosis


Assessment & Plan:  possible sepsis . on ceftriaxone and zithromax will upgrade 

his antibiotics to zosyn and zyvox  pending blood culture 





(3) JOAQUIN (acute kidney injury)


Assessment & Plan:  continue hydration, with close monitoring of renal function 

, avoid nephrotoxics 





(4) Respiratory failure with hypoxia


Assessment & Plan:  suspect due to the above, will order V/Q scan to rule out 

PE as an etiology , and CT chest to rule out lungs pathology , recommend 

pulmonary eval 








Subjective


Allergies:  


Coded Allergies:  


     ACETAMINOPHEN (Verified  Allergy, Unknown, 2/12/20)


     HYDROCODONE (Verified  Allergy, Unknown, 2/12/20)





Objective


Vital Signs





Last 24 Hour Vital Signs








  Date Time  Temp Pulse Resp B/P (MAP) Pulse Ox O2 Delivery O2 Flow Rate FiO2


 


2/12/20 12:00       8.0 


 


2/12/20 08:05 98.5 79 21 112/54 98 Simple Mask 7.0 


 


2/12/20 08:00      Simple Mask 8.0 


 


2/12/20 07:30 98.5 79 20 112/54 98 Simple Mask 7.0 


 


2/12/20 06:39 98.5 78 20 109/44 100 Simple Mask 15.0 


 


2/12/20 04:00 99.1 99 22 110/50 97 Non-Rebreather 15.0 


 


2/12/20 04:00  99 22   Non-Rebreather 15.0 


 


2/12/20 03:52 98.8 99 22 110/50 (70) 83 Room Air  








Height (Feet):  5


Height (Inches):  11.00


Weight (Pounds):  200





Microbiology








 Date/Time


Source Procedure


Growth Status


 


 


 2/12/20 04:24


Nasal Nares - Final Complete


 


 2/12/20 04:24


Nasal Nares - Final Complete


 


 2/12/20 04:00


Rectum  Received











Laboratory Tests








Test


  2/12/20


04:08 2/12/20


04:24 2/12/20


04:44 2/12/20


12:12


 


Arterial Blood pH


  7.470


(7.350-7.450) 


  


  7.331


(7.350-7.450)


 


Arterial Blood Partial


Pressure CO2 30.2 mmHg


(35.0-45.0)  L 


  


  39.6 mmHg


(35.0-45.0)


 


Arterial Blood Partial


Pressure O2 120.3 mmHg


(75.0-100.0)  H 


  


  49.6 mmHg


(75.0-100.0)


 


Arterial Blood HCO3


  21.6 mmol/L


(22.0-26.0)  L 


  


  20.5 mmol/L


(22.0-26.0)  L


 


Arterial Blood Oxygen


Saturation 98.5 %


() 


  


  82.5 %


()  *L


 


Arterial Blood Base Excess -1.2 (-2-2)     -5.0 (-2-2)  L


 


Shawn Test Positive     Positive  


 


White Blood Count


  


  19.9 K/UL


(4.8-10.8)  H 


  


 


 


Red Blood Count


  


  3.73 M/UL


(4.70-6.10)  L 


  


 


 


Hemoglobin


  


  11.6 G/DL


(14.2-18.0)  L 


  


 


 


Hematocrit


  


  34.5 %


(42.0-52.0)  L 


  


 


 


Mean Corpuscular Volume  92 FL (80-99)    


 


Mean Corpuscular Hemoglobin


  


  31.2 PG


(27.0-31.0)  H 


  


 


 


Mean Corpuscular Hemoglobin


Concent 


  33.7 G/DL


(32.0-36.0) 


  


 


 


Red Cell Distribution Width


  


  16.4 %


(11.6-14.8)  H 


  


 


 


Platelet Count


  


  247 K/UL


(150-450) 


  


 


 


Mean Platelet Volume


  


  5.5 FL


(6.5-10.1)  L 


  


 


 


Neutrophils (%) (Auto)


  


  % (45.0-75.0)


  


  


 


 


Lymphocytes (%) (Auto)


  


  % (20.0-45.0)


  


  


 


 


Monocytes (%) (Auto)   % (1.0-10.0)    


 


Eosinophils (%) (Auto)   % (0.0-3.0)    


 


Basophils (%) (Auto)   % (0.0-2.0)    


 


Differential Total Cells


Counted 


  100  


  


  


 


 


Neutrophils % (Manual)  80 % (45-75)  H  


 


Lymphocytes % (Manual)  15 % (20-45)  L  


 


Monocytes % (Manual)  5 % (1-10)    


 


Eosinophils % (Manual)  0 % (0-3)    


 


Basophils % (Manual)  0 % (0-2)    


 


Band Neutrophils  0 % (0-8)    


 


Platelet Estimate  Adequate    


 


Platelet Morphology  Normal    


 


Sodium Level


  


  140 MMOL/L


(136-145) 


  


 


 


Potassium Level


  


  4.5 MMOL/L


(3.5-5.1) 


  


 


 


Chloride Level


  


  105 MMOL/L


() 


  


 


 


Carbon Dioxide Level


  


  29 MMOL/L


(21-32) 


  


 


 


Anion Gap


  


  6 mmol/L


(5-15) 


  


 


 


Blood Urea Nitrogen


  


  39 mg/dL


(7-18)  H 


  


 


 


Creatinine


  


  2.8 MG/DL


(0.55-1.30)  H 


  


 


 


Estimat Glomerular Filtration


Rate 


  21.4 mL/min


(>60) 


  


 


 


Glucose Level


  


  127 MG/DL


()  H 


  


 


 


Calcium Level


  


  8.8 MG/DL


(8.5-10.1) 


  


 


 


Total Bilirubin


  


  0.5 MG/DL


(0.2-1.0) 


  


 


 


Aspartate Amino Transf


(AST/SGOT) 


  21 U/L (15-37)


  


  


 


 


Alanine Aminotransferase


(ALT/SGPT) 


  19 U/L (12-78)


  


  


 


 


Alkaline Phosphatase


  


  104 U/L


() 


  


 


 


Troponin I


  


  0.066 ng/mL


(0.000-0.056) 


  


 


 


Pro-B-Type Natriuretic Peptide


  


  2241 pg/mL


(0-125)  H 


  


 


 


Total Protein


  


  7.1 G/DL


(6.4-8.2) 


  


 


 


Albumin


  


  2.3 G/DL


(3.4-5.0)  L 


  


 


 


Globulin  4.8 g/dL    


 


Albumin/Globulin Ratio


  


  0.5 (1.0-2.7)


L 


  


 


 


Urine Color   Yellow   


 


Urine Appearance   Clear   


 


Urine pH   5 (4.5-8.0)   


 


Urine Specific Gravity


  


  


  1.020


(1.005-1.035) 


 


 


Urine Protein


  


  


  2+ (NEGATIVE)


H 


 


 


Urine Glucose (UA)


  


  


  Negative


(NEGATIVE) 


 


 


Urine Ketones


  


  


  1+ (NEGATIVE)


H 


 


 


Urine Blood


  


  


  1+ (NEGATIVE)


H 


 


 


Urine Nitrite


  


  


  Negative


(NEGATIVE) 


 


 


Urine Bilirubin


  


  


  1+ (NEGATIVE)


H 


 


 


Urine Ictotest


  


  


  Negative


(NEGATIVE) 


 


 


Urine Urobilinogen


  


  


  Normal MG/DL


(0.0-1.0) 


 


 


Urine Leukocyte Esterase


  


  


  Negative


(NEGATIVE) 


 


 


Urine RBC


  


  


  0-2 /HPF (0 -


0)  H 


 


 


Urine WBC


  


  


  0 /HPF (0 - 0)


  


 


 


Urine Squamous Epithelial


Cells 


  


  Few /LPF


(NONE/OCC) 


 


 


Urine Bacteria


  


  


  None /HPF


(NONE) 


 


 


Test


  2/12/20


12:15 


  


  


 


 


White Blood Count Pending     


 


Red Blood Count Pending     


 


Hemoglobin Pending     


 


Hematocrit Pending     


 


Mean Corpuscular Volume Pending     


 


Mean Corpuscular Hemoglobin Pending     


 


Mean Corpuscular Hemoglobin


Concent Pending  


  


  


  


 


 


Red Cell Distribution Width Pending     


 


Platelet Count Pending     


 


Mean Platelet Volume Pending     


 


Neutrophils (%) (Auto) Pending     


 


Lymphocytes (%) (Auto) Pending     


 


Monocytes (%) (Auto) Pending     


 


Eosinophils (%) (Auto) Pending     


 


Basophils (%) (Auto) Pending     


 


Sodium Level Pending     


 


Potassium Level Pending     


 


Chloride Level Pending     


 


Carbon Dioxide Level Pending     


 


Blood Urea Nitrogen Pending     


 


Creatinine Pending     


 


Estimat Glomerular Filtration


Rate Pending  


  


  


  


 


 


Glucose Level Pending     


 


Calcium Level Pending     


 


Troponin I Pending     


 


Pro-B-Type Natriuretic Peptide Pending     











Current Medications








 Medications


  (Trade)  Dose


 Ordered  Sig/Fito


 Route


 PRN Reason  Start Time


 Stop Time Status Last Admin


Dose Admin


 


 Albuterol/


 Ipratropium


  (Albuterol/


 Ipratropium)  3 ml  Q6HRT


 HHN


   2/12/20 13:00


 2/17/20 12:59   


 


 


 Aspirin


  (ASA)  81 mg  DAILY


 ORAL


   2/13/20 09:00


 3/14/20 08:59   


 


 


 Atorvastatin


 Calcium


  (Lipitor)  20 mg  BEDTIME


 ORAL


   2/12/20 21:00


 3/13/20 20:59   


 


 


 Azithromycin 500


 mg/Dextrose  275 ml @ 


 275 mls/hr  Q24HRS


 IV


   2/13/20 09:00


 2/19/20 09:59   


 


 


 Ceftriaxone


 Sodium 1 gm/


 Dextrose  55 ml @ 


 110 mls/hr  Q24H


 IVPB


   2/13/20 08:00


 2/20/20 07:59   


 


 


 Clopidogrel


 Bisulfate


  (Plavix)  75 mg  DAILY


 ORAL


   2/13/20 09:00


 3/14/20 08:59   


 


 


 Dextrose


  (Dextrose 50%)  25 ml  Q30M  PRN


 IV


 Hypoglycemia  2/12/20 08:45


 3/13/20 08:44   


 


 


 Dextrose


  (Dextrose 50%)  50 ml  Q30M  PRN


 IV


 Hypoglycemia  2/12/20 08:45


 3/13/20 08:44   


 


 


 Divalproex Sodium


  (Depakote


 Sprinkles)  125 mg  BID


 ORAL


   2/12/20 18:00


 3/13/20 17:59   


 


 


 Docusate Sodium


  (Colace)  100 mg  THREE TIMES A  DAY


 ORAL


   2/12/20 13:00


 3/13/20 12:59   


 


 


 Donepezil HCl


  (Aricept)  10 mg  DAILY


 ORAL


   2/13/20 09:00


 3/14/20 08:59   


 


 


 Heparin Sodium


  (Porcine)


  (Heparin 5000


 units/ml)  5,000 units  EVERY 12  HOURS


 SUBQ


   2/12/20 09:00


 3/13/20 08:59  2/12/20 10:09


 


 


 Insulin Aspart


  (NovoLOG)    BEFORE MEALS AND  HS


 SUBQ


   2/12/20 11:30


 3/13/20 11:29   


 


 


 Ipratropium


 Bromide


  (Atrovent)  500 mcg  Q6H  PRN


 HHN


 Shortness of Breath  2/12/20 10:00


 2/17/20 09:59   


 


 


 Lidocaine


  (Lidoderm 5%


 PATCH)  1 patch  DAILY


 TDERMAL


   2/13/20 09:00


 3/14/20 08:59   


 


 


 Methylprednisolone


 Sodium Succinate


  (Solu-MEDROL)  40 mg  EVERY 12  HOURS


 IVP


   2/12/20 21:00


 3/13/20 20:59   


 


 


 Metoprolol


 Tartrate


  (Lopressor)  25 mg  EVERY 12  HOURS


 ORAL


   2/12/20 21:00


 3/13/20 20:59   


 


 


 Sodium Chloride  1,000 ml @ 


 50 mls/hr  Q20H


 IV


   2/12/20 12:00


 3/13/20 11:59  2/12/20 12:17


 


 


 Tamsulosin HCl


  (Flomax)  0.4 mg  DAILY


 ORAL


   2/13/20 09:00


 3/14/20 08:59   


 

















Ana Alex M.D. Feb 12, 2020 13:18

## 2020-02-12 NOTE — NUR
NURSE NOTES:

Dr. Joe aware of ABG results. NO new orders. PAtient remains on simple mask @ 8L now O2 
sat 94%

## 2020-02-12 NOTE — CONSULTATION
DATE OF CONSULTATION:  02/12/2020

INFECTIOUS DISEASES CONSULTATION



CONSULTING PHYSICIAN:  Ana Alex M.D.



REQUESTING PHYSICIAN:  Kandy Hardwick M.D.



REASON FOR CONSULTATION:  Pneumonia, sepsis.  Recommendation for

antibiotics treatment.



HISTORY OF PRESENT ILLNESS:  The patient is an 89-year-old male with past

medical history of dementia, chronic obstructive pulmonary disease,

cardiac arrhythmia status post pacemaker placement was sent from [facility]

nursing [facility] for hypoxemia, cough productive, and altered mental

status.  The patient as per the report from his facility was found to be

hypoxemic with low oxygen saturation around 80% and had intermittent cough

productive.  No fever was reported at his facility.  The patient is

nonverbal at the baseline with severe dementia and was found altered when

the paramedics arrived.  The patient received nebulizer treatment on the

scene without significant improvement with oxygen sat only low 90% so he

was brought into the emergency room for evaluation and management.  His O2

saturation was 83% on room air in the ER with pulse of 99 and temperature

of 98.8 concerning for sepsis.  WBC also were elevated suggestive of

sepsis.  Chest x-ray did not did not show any acute infiltration.  The

patient was started on ceftriaxone and Zithromax, admitted to the

telemetry unit and Infectious Disease consultation was requested for

antibiotics treatment and further care.  The patient was seen and

examined.  He is altered, unresponsive, coughing, congested, does not

follow any commands.



REVIEW OF SYSTEMS:  Unable to obtain.  The patient is altered, confused,

unresponsive, could not provide any history.



PAST MEDICAL HISTORY:  Significant for dementia, COPD, cardiac arrhythmia

status post pacemaker placement.



PAST SURGICAL HISTORY:  Pacemaker placement.



______



SOCIAL HISTORY:  He is nursing home resident.  He is retired.  No recent

drugs, tobacco, or alcohol.



ALLERGIES:  He is allergic to acetaminophen and hydrocodone.



LABORATORY AND DIAGNOSTIC DATA:  White count of 19.9, hemoglobin of 11.6,

platelet count of 247.  BUN of 43 and creatinine of 2.2.  Urinalysis

showed positive ketone and blood with negative wbc's and nitrite.

Microbiology influenza A and B screening were both negative.  Imaging

chest x-ray showed posterior left lateral hemidiaphragm, parenchymal or

pleural disease of the left lung base possible, no acute process

otherwise, sclerotic lesion of the proximal humerus most likely bone

island, less likely osteoblastic metastases.



PHYSICAL EXAMINATION:

VITAL SIGNS:  Temperature 98.5, pulse 79, respirations 21, blood pressure

112/54, saturation 98% on Ventimask.

GENERAL:  Elderly male, lying in bed, altered, unresponsive, coughing and

congested on Ventimask, not in acute distress.

HEENT:  Normocephalic and atraumatic, unable to assess pupils.  The patient

does not follow command.  Dry oral mucosa.  No exudate or thrush.

NECK:  Supple.  No local tenderness or lymphadenopathy.

CARDIOVASCULAR:  Tachycardic.  S1 and S2 normal.  No murmur or gallop.

LUNGS:  Crackles diffuse with wheezing and diminished breathing sounds and

tachypnea.

ABDOMEN:  Soft, nontender, nondistended.  Normal bowel sounds.  No

hepatosplenomegaly.  No ascites.

EXTREMITIES:  No edema or cyanosis.

SKIN:  No rash.  No hives.



ASSESSMENT AND RECOMMENDATION:

1. COPD with exacerbation and cough productive rule out aspiration

pneumonia.  We will upgrade his antibiotics to Zosyn and Zyvox and stop

ceftriaxone and Zithromax empirically.  Send sputum culture.  Continue

inhalers as needed with extensive suctioning, aspiration precaution.  Keep

head of bed more than 35 degree.

2. Leukocytosis, possible sepsis due to the above.  We will switch his

antibiotics to Zosyn and Zyvox, pending blood culture results.  Obtain CT

scan to evaluate his lung parenchyma.

3. Acute kidney injury.  Continue hydration with close monitor of renal

function, avoid nephrotoxics.

4. Respiratory failure with hypoxemia, acute.  Suspect due to the above.

We will order VQ scan to rule out PE as an etiology and CT chest to rule

out lungs pathology.  Recommend pulmonary evaluation.



Thank you for the consult.  ID will continue to follow.  Please feel

free to call with any question.









  ______________________________________________

  Ana Alex M.D.





DR:  Vale

D:  02/12/2020 13:52

T:  02/12/2020 17:13

JOB#:  [id number]

CC:

## 2020-02-12 NOTE — DIAGNOSTIC IMAGING REPORT
Clinical Indication: Chronic obstructive pulmonary disease with exacerbation,

leukocytosis

 

Technique: Spiral acquisitions obtained through the chest. No IV contrast utilized, .

Multiplanar reconstructions generated. Total dose length product mGycm. CTDIvol(s)

mGy.  Dose reduction achieved using automated exposure control

 

 

Comparison: none

 

Findings:There is some image degradation due to respiratory motion artifact.

Consolidation and atelectasis are seen at both lung bases, slightly more extensive on

the left than on the right. Mild interstitial congestion is seen bilaterally

diffusely. No definite pleural fluid demonstrated.

 

The heart size is upper limits of normal. No pericardial effusion. Pacemaker is

noted. Calcified granulomatous nodes are seen in the left pulmonary hilum. The

included portions of the thyroid are unremarkable. No mediastinal or hilar mass or

adenopathy demonstrated. No axillary or chest wall mass or adenopathy. There are

median sternotomy sutures.

 

The bones demonstrate an osteosclerotic lesion at the right humeral head neck

junction. There are degenerative changes of the thoracic spine.

 

The included upper abdominal anatomy demonstrates cholelithiasis. The gallbladder is

somewhat distended, but there is no wall thickening. There is prominent colonic fecal

material. There is an accessory splenule..

 

Impression: Bilateral basilar infiltrates and atelectasis

 

Mild interstitial congestion bilaterally

 

Evidence of old granulomatous disease

 

Pacemaker

 

Evidence of prior median sternotomy

 

Right humeral osteoporotic lesion, most likely a benign bone island. Osteoblastic

metastasis is not completely excludable but deemed less likely

 

Cholelithiasis

 

Possible constipation.

 

Degenerative spondylosis

 

 

 

The CT scanner at [facility] is accredited by the American College of

Radiology and the scans are performed using protocols designed to limit radiation

exposure to as low as reasonably achievable to attain images of sufficient resolution

adequate for diagnostic evaluation.

## 2020-02-12 NOTE — NUR
ED Nurse Note:



Pt brought in by RA from [facility] c/o SOB. As per EMS, pt is desaturating 
to 85% RA. Pt was placed on non rebreather mask 15L/min, O2 sat 97%. Pt has hx 
of COPD. Bilateral lungs are clear. Not in any distress. VSS. ERMD at bedside.

## 2020-02-12 NOTE — NUR
NURSE NOTES:

Attempted to insert NGT on patient together with another RN. Unsuccessful Resistance 
observed on both nostrils. Message left to Ronen awaiting call back.

## 2020-02-12 NOTE — CONSULTATION
DATE OF CONSULTATION:  02/12/2020

CARDIOLOGY CONSULTATION



CONSULTING PHYSICIAN:  Bob Fuentes M.D.



REFERRING PHYSICIAN:  Kandy Hardwick M.D.



REASON FOR CONSULTATION:  Elevated troponin in a patient with history of

coronary artery bypass graft as well as evaluation of the patient's

pacemaker.



HISTORY OF PRESENT ILLNESS:  The patient is a 89-year-old [place] gentleman

with severe dementia who is a nursing home patient as well as history of

COPD and history of coronary artery bypass graft more than 20 years ago as

well as history of pacemaker placement in the past who was brought in from

nursing facility for hypoxemia.  The patient's saturation was in the low

80s and the patient had intermittent cough.  The patient is nonverbal at

baseline due to severe dementia.  The patient was admitted.  His troponin

turned out to be become positive and a Cardiology consultation was

obtained for further evaluation.  At the time of my evaluation, the

patient is nonverbal.  The son is at the bedside, is not able to provide

any information.



REVIEW OF SYSTEMS:  Cannot be obtained due to severe dementia.



PAST MEDICAL HISTORY:  As mentioned above.



FAMILY HISTORY:  Noncontributory.



SOCIAL HISTORY:  He lives in nursing home.  Does not smoke or drink

alcohol.



PHYSICAL EXAMINATION:

VITAL SIGNS:  Blood pressure 112/54, pulse 79, respirations 20, temperature

98.5.

HEAD AND NECK:  Shows no JVD.

LUNGS:  Coarse rhonchi.

CARDIOVASCULAR:  Regular S1 and S2 with no gallop or murmur.

ABDOMEN:  Soft.

EXTREMITIES:  No pitting edema.



His pacemaker is in left subclavian and sternotomy scar is intact.



LABORATORY AND DIAGNOSTIC DATA:  His labs show sodium 140, potassium 4.5,

BUN of 39, creatinine 2.8, and glucose 127.  Troponin is 0.066.  BNP is

2241.  White count is 19.9, hemoglobin is 11.7, hematocrit 34.5, platelet

count of 247.  EKG shows sinus rhythm with complete right bundle-branch

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



ASSESSMENT AND PLAN:

1. Non-ST elevation myocardial infarction likely type 2 in view of the

patient's renal failure.  This is likely due to demand ischemia in view of

patient's sepsis, white count of 20,000.  The patient also has renal

failure with creatinine of 2.8.  We will completely rule out myocardial

infarction protocol, get an echocardiogram, and repeat EKG.  At this time,

we will treat the patient medically with aspirin, beta-blocker, and

statin.

2. Status post pacemaker.  We will have to find out the brand as the son

does not know the brand of the pacemaker and the patient is nonverbal.

3. Sepsis, COPD, pneumonia.  The patient is on ceftriaxone,

azithromycin.

4. Advanced dementia.



Thank you very much, Dr. Hardwick, for allowing me to participate in

the care of this patient.  Please do not hesitate to contact me for any

questions regarding my evaluation.



Sincerely,









  ______________________________________________

  Bob Fuentes M.D.





DR:  Ashley

D:  02/12/2020 09:43

T:  02/12/2020 16:00

JOB#:  [id number]

CC:

## 2020-02-13 VITALS — SYSTOLIC BLOOD PRESSURE: 103 MMHG | DIASTOLIC BLOOD PRESSURE: 74 MMHG

## 2020-02-13 VITALS — DIASTOLIC BLOOD PRESSURE: 44 MMHG | SYSTOLIC BLOOD PRESSURE: 116 MMHG

## 2020-02-13 VITALS — SYSTOLIC BLOOD PRESSURE: 105 MMHG | DIASTOLIC BLOOD PRESSURE: 47 MMHG

## 2020-02-13 VITALS — DIASTOLIC BLOOD PRESSURE: 59 MMHG | SYSTOLIC BLOOD PRESSURE: 175 MMHG

## 2020-02-13 LAB
ADD MANUAL DIFF: NO
ANION GAP SERPL CALC-SCNC: 12 MMOL/L (ref 5–15)
BUN SERPL-MCNC: 44 MG/DL (ref 7–18)
CALCIUM SERPL-MCNC: 8.6 MG/DL (ref 8.5–10.1)
CHLORIDE SERPL-SCNC: 107 MMOL/L (ref 98–107)
CHOLEST SERPL-MCNC: 119 MG/DL (ref ?–200)
CO2 SERPL-SCNC: 24 MMOL/L (ref 21–32)
CREAT SERPL-MCNC: 1.9 MG/DL (ref 0.55–1.3)
ERYTHROCYTE [DISTWIDTH] IN BLOOD BY AUTOMATED COUNT: 16.4 % (ref 11.6–14.8)
HCT VFR BLD CALC: 30.6 % (ref 42–52)
HDLC SERPL-MCNC: 28 MG/DL (ref 40–60)
HGB BLD-MCNC: 10.4 G/DL (ref 14.2–18)
MCV RBC AUTO: 94 FL (ref 80–99)
PLATELET # BLD: 244 K/UL (ref 150–450)
POTASSIUM SERPL-SCNC: 4.2 MMOL/L (ref 3.5–5.1)
RBC # BLD AUTO: 3.25 M/UL (ref 4.7–6.1)
SODIUM SERPL-SCNC: 142 MMOL/L (ref 136–145)
TRIGL SERPL-MCNC: 93 MG/DL (ref 30–150)
WBC # BLD AUTO: 11.2 K/UL (ref 4.8–10.8)

## 2020-02-13 RX ADMIN — DOCUSATE SODIUM SCH MG: 100 CAPSULE, LIQUID FILLED ORAL at 09:00

## 2020-02-13 RX ADMIN — INSULIN ASPART SCH UNITS: 100 INJECTION, SOLUTION INTRAVENOUS; SUBCUTANEOUS at 06:30

## 2020-02-13 RX ADMIN — DONEPEZIL HYDROCHLORIDE SCH MG: 10 TABLET, FILM COATED ORAL at 09:00

## 2020-02-13 RX ADMIN — DIVALPROEX SODIUM SCH MG: 125 CAPSULE ORAL at 09:00

## 2020-02-13 RX ADMIN — HEPARIN SODIUM SCH UNITS: 5000 INJECTION INTRAVENOUS; SUBCUTANEOUS at 20:51

## 2020-02-13 RX ADMIN — DIVALPROEX SODIUM SCH MG: 125 CAPSULE ORAL at 18:00

## 2020-02-13 RX ADMIN — IPRATROPIUM BROMIDE AND ALBUTEROL SULFATE SCH ML: .5; 3 SOLUTION RESPIRATORY (INHALATION) at 01:04

## 2020-02-13 RX ADMIN — DEXTROSE MONOHYDRATE SCH MLS/HR: 50 INJECTION, SOLUTION INTRAVENOUS at 08:00

## 2020-02-13 RX ADMIN — IPRATROPIUM BROMIDE AND ALBUTEROL SULFATE SCH ML: .5; 3 SOLUTION RESPIRATORY (INHALATION) at 07:18

## 2020-02-13 RX ADMIN — DOCUSATE SODIUM SCH MG: 100 CAPSULE, LIQUID FILLED ORAL at 15:22

## 2020-02-13 RX ADMIN — DOCUSATE SODIUM SCH MG: 100 CAPSULE, LIQUID FILLED ORAL at 13:00

## 2020-02-13 RX ADMIN — IPRATROPIUM BROMIDE AND ALBUTEROL SULFATE SCH ML: .5; 3 SOLUTION RESPIRATORY (INHALATION) at 19:40

## 2020-02-13 RX ADMIN — ATORVASTATIN CALCIUM SCH MG: 20 TABLET, FILM COATED ORAL at 21:00

## 2020-02-13 RX ADMIN — TAMSULOSIN HYDROCHLORIDE SCH MG: 0.4 CAPSULE ORAL at 09:00

## 2020-02-13 RX ADMIN — ASPIRIN 81 MG SCH MG: 81 TABLET ORAL at 09:00

## 2020-02-13 RX ADMIN — HEPARIN SODIUM SCH UNITS: 5000 INJECTION INTRAVENOUS; SUBCUTANEOUS at 09:00

## 2020-02-13 RX ADMIN — METHYLPREDNISOLONE SODIUM SUCCINATE SCH MG: 40 INJECTION, POWDER, LYOPHILIZED, FOR SOLUTION INTRAMUSCULAR; INTRAVENOUS at 09:18

## 2020-02-13 RX ADMIN — IPRATROPIUM BROMIDE AND ALBUTEROL SULFATE SCH ML: .5; 3 SOLUTION RESPIRATORY (INHALATION) at 12:39

## 2020-02-13 RX ADMIN — INSULIN ASPART SCH UNITS: 100 INJECTION, SOLUTION INTRAVENOUS; SUBCUTANEOUS at 16:02

## 2020-02-13 RX ADMIN — DEXTROSE MONOHYDRATE SCH MLS/HR: 50 INJECTION, SOLUTION INTRAVENOUS at 00:13

## 2020-02-13 RX ADMIN — INSULIN ASPART SCH UNITS: 100 INJECTION, SOLUTION INTRAVENOUS; SUBCUTANEOUS at 21:00

## 2020-02-13 RX ADMIN — DEXTROSE MONOHYDRATE SCH MLS/HR: 50 INJECTION, SOLUTION INTRAVENOUS at 23:01

## 2020-02-13 RX ADMIN — DEXTROSE MONOHYDRATE SCH MLS/HR: 50 INJECTION, SOLUTION INTRAVENOUS at 16:02

## 2020-02-13 RX ADMIN — INSULIN ASPART SCH UNITS: 100 INJECTION, SOLUTION INTRAVENOUS; SUBCUTANEOUS at 11:30

## 2020-02-13 RX ADMIN — METHYLPREDNISOLONE SODIUM SUCCINATE SCH MG: 40 INJECTION, POWDER, LYOPHILIZED, FOR SOLUTION INTRAMUSCULAR; INTRAVENOUS at 20:50

## 2020-02-13 NOTE — INFECTIOUS DISEASES PROG NOTE
Assessment/Plan


Problems:  


(1) Basal pneumonia of both lungs


Assessment & Plan:  confirmed on CT chest , with cough and congestion , suspect 

aspiration , already on zosyn and zyvox , pending sputum culture , aspiration 

precaution, keep HOB > 30 degree 





(2) COPD (chronic obstructive pulmonary disease)


Assessment & Plan:  with exacerbation , continue antibiotics , inhalers , 

pending  sputum culture 





(3) Leukocytosis


Assessment & Plan:  possible sepsis . on zosyn and zyvox  pending blood culture 





(4) JOAQUIN (acute kidney injury)


Assessment & Plan:  continue hydration, with close monitoring of renal function 

, avoid nephrotoxics 





(5) Respiratory failure with hypoxia


Assessment & Plan:  suspect due to the above, will order V/Q scan to rule out 

PE as an etiology , and CT chest to rule out lungs pathology , recommend 

pulmonary eval 








Subjective


ROS Limited/Unobtainable:  Yes


Allergies:  


Coded Allergies:  


     ACETAMINOPHEN (Verified  Allergy, Unknown, 2/12/20)


     HYDROCODONE (Verified  Allergy, Unknown, 2/12/20)


Subjective


He was still altered and unresponsive, on high flow oxygen via mask, and on 

restrains, A febrile , still congested





Objective


Vital Signs





Last 24 Hour Vital Signs








  Date Time  Temp Pulse Resp B/P (MAP) Pulse Ox O2 Delivery O2 Flow Rate FiO2


 


2/13/20 12:49  61 20  99 Simple Mask 8.0 50





  60 20  96   


 


2/13/20 12:04 96.6  19 105/47 (66) 64   


 


2/13/20 12:04       8.0 


 


2/13/20 09:00      Simple Mask 8.0 


 


2/13/20 08:00  71      


 


2/13/20 08:00       8.0 


 


2/13/20 08:00 98.2  19 103/74 (84) 65   


 


2/13/20 07:28  72 18  100 Simple Mask 8.0 50





  61 18  97   


 


2/13/20 07:17     97 Simple Mask 8.0 50


 


2/13/20 04:00       8.0 


 


2/13/20 04:00  69      


 


2/13/20 01:05  75 18  99 Simple Mask 8.0 40





  70 19  97   


 


2/13/20 00:00  84      


 


2/12/20 21:00  97  94/41    


 


2/12/20 21:00      Simple Mask 8.0 


 


2/12/20 20:00       8.0 


 


2/12/20 20:00  62      


 


2/12/20 19:21  72 19  99 Simple Mask 8.0 40





  68 22  97   


 


2/12/20 19:05     97 Simple Mask 8.0 45


 


2/12/20 19:00    115/65 (82)    


 


2/12/20 17:00    125/75 (92) 68   


 


2/12/20 16:00 97.3   112/65 (81) 67   


 


2/12/20 16:00  65      


 


2/12/20 16:00       8.0 


 


2/12/20 15:30    96/54 (68) 66   


 


2/12/20 15:00    102/51 (68) 72   


 


2/12/20 14:30    101/48 (65) 68   


 


2/12/20 14:00    93/50 (64) 92   








Height (Feet):  5


Height (Inches):  11.00


Weight (Pounds):  200


General Appearance:  no acute distress, cachetic, other - altered


HEENT:  normocephalic, atraumatic, anicteric, no JVD, other - dry mouth , 

pupils are fixed


Respiratory/Chest:  decreased breath sounds, crackles/rales, expiratory wheezing


Cardiovascular:  normal peripheral pulses, normal rate, regular rhythm, no 

gallop/murmur, no JVD


Abdomen:  normal bowel sounds, soft, non tender, no organomegaly, non distended

, no mass, no scars


Extremities:  no cyanosis, no clubbing


Skin:  no rash, no lesions


Neurologic/Psychiatric:  unresponsiveness


Lymphatic:  no neck adenopathy, no groin adenopathy


Musculoskeletal:  normal muscle bulk, no effusion





Microbiology








 Date/Time


Source Procedure


Growth Status


 


 


 2/12/20 17:50


Sputum Expectorated Gram Stain - Final Resulted


 


 2/12/20 17:50


Sputum Expectorated Sputum Culture


Pending Resulted





 2/12/20 04:24


Nasal Nares - Final Complete


 


 2/12/20 04:24


Nasal Nares - Final Complete


 


 2/12/20 04:00


Rectum  Received











Laboratory Tests








Test


  2/13/20


05:23


 


White Blood Count


  11.2 K/UL


(4.8-10.8)  H


 


Red Blood Count


  3.25 M/UL


(4.70-6.10)  L


 


Hemoglobin


  10.4 G/DL


(14.2-18.0)  L


 


Hematocrit


  30.6 %


(42.0-52.0)  L


 


Mean Corpuscular Volume 94 FL (80-99)  


 


Mean Corpuscular Hemoglobin


  31.9 PG


(27.0-31.0)  H


 


Mean Corpuscular Hemoglobin


Concent 33.9 G/DL


(32.0-36.0)


 


Red Cell Distribution Width


  16.4 %


(11.6-14.8)  H


 


Platelet Count


  244 K/UL


(150-450)


 


Mean Platelet Volume


  5.8 FL


(6.5-10.1)  L


 


Neutrophils (%) (Auto)


  % (45.0-75.0)


 


 


Lymphocytes (%) (Auto)


  % (20.0-45.0)


 


 


Monocytes (%) (Auto)  % (1.0-10.0)  


 


Eosinophils (%) (Auto)  % (0.0-3.0)  


 


Basophils (%) (Auto)  % (0.0-2.0)  


 


Sodium Level


  142 MMOL/L


(136-145)


 


Potassium Level


  4.2 MMOL/L


(3.5-5.1)


 


Chloride Level


  107 MMOL/L


()


 


Carbon Dioxide Level


  24 MMOL/L


(21-32)


 


Anion Gap


  12 mmol/L


(5-15)


 


Blood Urea Nitrogen


  44 mg/dL


(7-18)  H


 


Creatinine


  1.9 MG/DL


(0.55-1.30)  H


 


Estimat Glomerular Filtration


Rate 33.5 mL/min


(>60)


 


Glucose Level


  283 MG/DL


()  H


 


Calcium Level


  8.6 MG/DL


(8.5-10.1)


 


Troponin I


  0.082 ng/mL


(0.000-0.056)


 


Pro-B-Type Natriuretic Peptide


  1805 pg/mL


(0-125)  H


 


Triglycerides Level


  93 MG/DL


()


 


Cholesterol Level


  119 MG/DL (<


200)


 


LDL Cholesterol


  51 mg/dL


(<100)


 


HDL Cholesterol


  28 MG/DL


(40-60)  L


 


Cholesterol/HDL Ratio 4.3 (3.3-4.4)  











Current Medications








 Medications


  (Trade)  Dose


 Ordered  Sig/Fito


 Route


 PRN Reason  Start Time


 Stop Time Status Last Admin


Dose Admin


 


 Albuterol/


 Ipratropium


  (Albuterol/


 Ipratropium)  3 ml  Q6HRT


 HHN


   2/12/20 13:00


 2/17/20 12:59  2/13/20 12:39


 


 


 Aspirin


  (ASA)  81 mg  DAILY


 ORAL


   2/13/20 09:00


 3/14/20 08:59   


 


 


 Atorvastatin


 Calcium


  (Lipitor)  20 mg  BEDTIME


 ORAL


   2/12/20 21:00


 3/13/20 20:59   


 


 


 Clopidogrel


 Bisulfate


  (Plavix)  75 mg  DAILY


 ORAL


   2/13/20 09:00


 3/14/20 08:59   


 


 


 Dextrose


  (Dextrose 50%)  25 ml  Q30M  PRN


 IV


 Hypoglycemia  2/12/20 08:45


 3/13/20 08:44   


 


 


 Dextrose


  (Dextrose 50%)  50 ml  Q30M  PRN


 IV


 Hypoglycemia  2/12/20 08:45


 3/13/20 08:44   


 


 


 Divalproex Sodium


  (Depakote


 Sprinkles)  125 mg  BID


 ORAL


   2/12/20 18:00


 3/13/20 17:59   


 


 


 Docusate Sodium


  (Colace)  100 mg  THREE TIMES A  DAY


 ORAL


   2/12/20 13:00


 3/13/20 12:59   


 


 


 Donepezil HCl


  (Aricept)  10 mg  DAILY


 ORAL


   2/13/20 09:00


 3/14/20 08:59   


 


 


 Heparin Sodium


  (Porcine)


  (Heparin 5000


 units/ml)  5,000 units  EVERY 12  HOURS


 SUBQ


   2/12/20 09:00


 3/13/20 08:59  2/12/20 21:34


 


 


 Insulin Aspart


  (NovoLOG)    BEFORE MEALS AND  HS


 SUBQ


   2/12/20 11:30


 3/13/20 11:29  2/12/20 17:21


 


 


 Ipratropium


 Bromide


  (Atrovent)  500 mcg  Q6H  PRN


 HHN


 Shortness of Breath  2/12/20 10:00


 2/17/20 09:59   


 


 


 Lidocaine


  (Lidoderm 5%


 PATCH)  1 patch  DAILY


 TDERMAL


   2/13/20 09:00


 3/14/20 08:59   


 


 


 Linezolid  300 ml @ 


 300 mls/hr  Q12H


 IVPB


   2/12/20 17:00


 2/19/20 16:59  2/13/20 05:00


 


 


 Methylprednisolone


 Sodium Succinate


  (Solu-MEDROL)  40 mg  EVERY 12  HOURS


 IVP


   2/12/20 21:00


 3/13/20 20:59  2/13/20 09:18


 


 


 Metoprolol


 Tartrate


  (Lopressor)  25 mg  EVERY 12  HOURS


 ORAL


   2/12/20 21:00


 3/13/20 20:59   


 


 


 Piperacillin Sod/


 Tazobactam Sod


 3.375 gm/Sodium


 Chloride  110 ml @ 


 27.5 mls/hr  Q8H


 IVPB


   2/12/20 16:00


 2/19/20 15:59  2/13/20 08:00


 


 


 Sodium Chloride  1,000 ml @ 


 50 mls/hr  Q20H


 IV


   2/12/20 12:00


 3/13/20 11:59  2/13/20 08:00


 


 


 Tamsulosin HCl


  (Flomax)  0.4 mg  DAILY


 ORAL


   2/13/20 09:00


 3/14/20 08:59   


 

















Ana Alex M.D. Feb 13, 2020 13:32

## 2020-02-13 NOTE — NUR
NURSE NOTES:  Received patient from Devin TOLBERT.  Patient in bed, on simple mask 8L, no signs 
of respiratory distress.  Patient is restless, non compliant and resistant to care.  
Confused, unable to follow directions.  Bilateral soft wrist restraints on.  Rapp catheter 
intact.  NS infusing at 50ml/hr through left wrist 22 gauge.  Bed in low position, locked, 
bed alarm on, call light within reach.

## 2020-02-13 NOTE — NUR
NG TUBE INSERTED AS ORDERED BY FAHEEM REDMAN AND X-RAY DONE PER PROTOCOL

-------------------------------------------------------------------------------

Addendum: 02/13/20 at 1830 by RONALDO SOTO RN RN

-------------------------------------------------------------------------------

new ng tube inserted @ 1445 and stat chest x ray ordered per protocol to confirm placement

## 2020-02-13 NOTE — GENERAL PROGRESS NOTE
Assessment/Plan


Status:  stable


Assessment/Plan:


1. Pneumonia - cont IV Abx 


2. Leukocytosis improving, possible sepsis - cont IV abx.


3. Elevated Troponin 2nd to NSTEMI due to demand Ischemia most likely due to 

KELVIN and sepsis - cardiology following.


4. COPD exacerbation - on solumedrol and pulmonary following.


5. Hypotension 2nd to sepsis and pneumonia - improving.  will hold hypertensive 

meds for now.


6. Dementia - will restart home meds after NG tube placement by Dr Segovia. 


7. Acute Hypoxemic respiratory Failure 2nd to pneumonia and sepsis


8. JOAQUIN - improving with hydration.


9. Hypertensive heart disease


10. HLD - will resume med after NG tube placement. 


11. DM II - Insulin on hold until NG tube is placed.and Tube feeding starts.


12. CAD s/p CABG and stent placement.


13. H/O PVD


14. H/O angioedema in the past.





Subjective


Date patient seen:  Feb 13, 2020


Time patient seen:  08:50


Constitutional:  Reports: weakness


HEENT:  Reports: no symptoms


Cardiovascular:  Reports: no symptoms


Respiratory:  Reports: no symptoms, other - Rt sided rhonchi


Gastrointestinal/Abdominal:  Reports: no symptoms


Genitourinary:  Reports: no symptoms


Neurologic/Psychiatric:  Reports: no symptoms


Hematologic/Lymphatic:  Reports: no symptoms


Allergies:  


Coded Allergies:  


     ACETAMINOPHEN (Verified  Allergy, Unknown, 2/12/20)


     HYDROCODONE (Verified  Allergy, Unknown, 2/12/20)


Subjective


This morning his BP has improved and it is in low 100 systolic.


Afebrile and His WBC improving.  He still drowsy and doesn't open his eyes.





Objective





Last 24 Hour Vital Signs








  Date Time  Temp Pulse Resp B/P (MAP) Pulse Ox O2 Delivery O2 Flow Rate FiO2


 


2/13/20 08:00 98.2  19 103/74 (84) 65   


 


2/13/20 07:28  72 18  100 Simple Mask 8.0 50





  61 18  97   


 


2/13/20 07:17     97 Simple Mask 8.0 50


 


2/13/20 04:00       8.0 


 


2/13/20 04:00  69      


 


2/13/20 01:05  75 18  99 Simple Mask 8.0 40





  70 19  97   


 


2/13/20 00:00  84      


 


2/12/20 21:00  97  94/41    


 


2/12/20 21:00      Simple Mask 8.0 


 


2/12/20 20:00       8.0 


 


2/12/20 20:00  62      


 


2/12/20 19:21  72 19  99 Simple Mask 8.0 40





  68 22  97   


 


2/12/20 19:05     97 Simple Mask 8.0 45


 


2/12/20 19:00    115/65 (82)    


 


2/12/20 17:00    125/75 (92) 68   


 


2/12/20 16:00 97.3   112/65 (81) 67   


 


2/12/20 16:00  65      


 


2/12/20 16:00       8.0 


 


2/12/20 15:30    96/54 (68) 66   


 


2/12/20 15:00    102/51 (68) 72   


 


2/12/20 14:30    101/48 (65) 68   


 


2/12/20 14:00    93/50 (64) 92   


 


2/12/20 13:30    84/43 (57) 98   


 


2/12/20 13:00    88/42 (57) 79   


 


2/12/20 12:30    92/50 (64) 70   


 


2/12/20 12:00       8.0 


 


2/12/20 12:00 96.8 67 22 91/39 (56) 96   


 


2/12/20 12:00  67      


 


2/12/20 10:30    98/40 (59) 92   


 


2/12/20 10:00    100/43 (62) 92   


 


2/12/20 09:30    85/41 (56) 73   

















Intake and Output  


 


 2/12/20 2/13/20





 19:00 07:00


 


Output Total 150 ml 600 ml


 


Balance -150 ml -600 ml


 


  


 


Output Urine Total 150 ml 600 ml








Laboratory Tests


2/12/20 12:12: 


Arterial Blood pH 7.331L, Arterial Blood Partial Pressure CO2 39.6, Arterial 

Blood Partial Pressure O2 49.6*L, Arterial Blood HCO3 20.5L, Arterial Blood 

Oxygen Saturation 82.5*L, Arterial Blood Base Excess -5.0L, Shawn Test Positive


2/12/20 12:15: 


White Blood Count 13.2H, Red Blood Count 3.44L, Hemoglobin 10.2L, Hematocrit 

32.3L, Mean Corpuscular Volume 94, Mean Corpuscular Hemoglobin 29.5, Mean 

Corpuscular Hemoglobin Concent 31.4L, Red Cell Distribution Width 16.8H, 

Platelet Count 174, Mean Platelet Volume 6.0L, Neutrophils (%) (Auto) , 

Lymphocytes (%) (Auto) , Monocytes (%) (Auto) , Eosinophils (%) (Auto) , 

Basophils (%) (Auto) , Differential Total Cells Counted 100, Neutrophils % (

Manual) 90H, Lymphocytes % (Manual) 8L, Monocytes % (Manual) 2, Eosinophils % (

Manual) 0, Basophils % (Manual) 0, Band Neutrophils 0, Platelet Estimate 

Adequate, Platelet Morphology Normal, Hypochromasia 1+, Anisocytosis 1+, Sodium 

Level 140, Potassium Level 4.4, Chloride Level 108H, Carbon Dioxide Level 23, 

Anion Gap 9, Blood Urea Nitrogen 43H, Creatinine 2.2H, Estimat Glomerular 

Filtration Rate 28.3, Glucose Level 217H, Calcium Level 8.3L, Troponin I 0.086H

, Pro-B-Type Natriuretic Peptide 1688H


2/13/20 05:23: 


White Blood Count 11.2H, Red Blood Count 3.25L, Hemoglobin 10.4L, Hematocrit 

30.6L, Mean Corpuscular Volume 94, Mean Corpuscular Hemoglobin 31.9H, Mean 

Corpuscular Hemoglobin Concent 33.9, Red Cell Distribution Width 16.4H, 

Platelet Count 244, Mean Platelet Volume 5.8L, Neutrophils (%) (Auto) , 

Lymphocytes (%) (Auto) , Monocytes (%) (Auto) , Eosinophils (%) (Auto) , 

Basophils (%) (Auto) , Sodium Level 142, Potassium Level 4.2, Chloride Level 107

, Carbon Dioxide Level 24, Anion Gap 12, Blood Urea Nitrogen 44H, Creatinine 

1.9H, Estimat Glomerular Filtration Rate 33.5, Glucose Level 283H, Calcium 

Level 8.6, Troponin I 0.082H, Pro-B-Type Natriuretic Peptide 1805H, 

Triglycerides Level 93, Cholesterol Level 119, LDL Cholesterol 51, HDL 

Cholesterol 28L, Cholesterol/HDL Ratio 4.3


Height (Feet):  5


Height (Inches):  11.00


Weight (Pounds):  200


General Appearance:  no apparent distress, lethargic


EENT:  normal ENT inspection


Neck:  non-tender, normal alignment, supple


Cardiovascular:  normal rate, regular rhythm


Respiratory/Chest:  normal breath sounds, crackles/rales


Abdomen:  normal bowel sounds, non tender, soft


Extremities:  non-tender


Edema:  no edema noted Arm (L), no edema noted Arm (R), no edema noted Leg (L), 

no edema noted Leg (R), no edema noted Pedal (L), no edema noted Pedal (R), no 

edema noted Generalized


Neurologic:  alert, responsive


Skin:  warm/dry











Kandy Hardwick MD Feb 13, 2020 09:16

## 2020-02-13 NOTE — NUR
ST NOTES:

SWALLOW STATUS:

PATIENT NOT ALERT TO MAX STIM. PER RT, PATIENT WILL WAKE UP DURING RESP TX 

AND TRY TO PULL AT HIS LINES. SPOKE WITH RN, RONALDO, THEY WERE UNABLE TO 

GET NGT INTO BOTH NOSTRILS. GI NP, FAHEEM, TO TRY TODAY. FAMILY DOES NOT 

WANT PEG. ? CONSIDER OGT IF NEEDED. 



PER RT:

Date: 2/13/20 07:17               Type: Oxygen Delivery & Pulse Ox Ass...

O2 Round 

Yes

Initial Therapy 

No

MD Order Obtained For Oxygen 

Yes

Bedside Pulse Oximetry

97 % ()

Oxygen Delivery Method

Simple Mask

Skin Intact 

Yes

Oxygen Flow Rate

8.0 L/min

Fraction of Inspired Oxygen (FiO2)

50 %

Hours 

12

RESP RATE NOW 18-19 (NOT ABOVE 25 BPM PREFERENCE WHEN READY FOR PO TRIALS AND CONSISTENTLY 
ALERT)



PLAN:

CONTINUE WITH ORAL CARE AND INITIATE NONORAL FEEDINGS FOR NOW (NO PO 

INCLUDES MEDS/ICE CHIPS)

MOD BARIUM SWALLOW STUDY WHEN READY

## 2020-02-13 NOTE — DIAGNOSTIC IMAGING REPORT
Indication: Bilateral lower extremity edema, shortness of breath

 

Technique: Grayscale and duplex images of the bilateral lower extremity veins

 

Comparison: none

 

Findings: Bilaterally, grayscale and duplex images demonstrate no evidence of

intraluminal thrombus. Normal phasic Doppler waveforms, demonstrating normal

augmentation response and no evidence of valvular insufficiency. Normal

compressibility. Patent greater saphenous and calf veins bilaterally.

 

Impression: Negative for lower extremity deep venous thrombosis bilaterally

## 2020-02-13 NOTE — GI INITIAL CONSULT NOTE
History of Present Illness


General


Date patient seen:  Feb 13, 2020


Time patient seen:  11:37


Reason for Hospitalization:  Dyspnea/Respdistress


Referring physician:  YANE HANKINS


Reason for Consultation:  NGT PLACEMENT





Present Illness


HPI


 This is an 89-year-old male with history of dementia, COPD, who was brought in 

for hypoxia with O2 saturation of low 75 to 80s.  The patient is a long-term 

resident of [facility].  The


patient reportedly has had cough and shortness of breath at the facility. The 

patient is nonverbal and had a history of pacemaker placement previously.  In 

the emergency room, the patient received IV antibiotics and hydration and will 

be admitted for respiratory failure and COPD exacerbation with possible sepsis.


GI consulted for nasogastric tube placement.  ROS limited, patient with history 

nonverbal.  Patient seen, awake alert no apparent distress currently n.p.o. 

with IV fluids.  Patient initially admitted for possible respiratory failure 

and COPD exacerbation.  According to the report from the RN, multiple attempts 

failed attempts for nasogastric tube placement.  Unknown history of endoscopic 

or colonoscopy.  Laboratory review significant WBC 11.2, hemoglobin 10.4, 

hematocrit 30.6, creatinine 1.9, troponin level 0.082.


Home Meds


Reported Medications


Ipratropium Bromide 0.5MG/2.5ML (IPRATROPIUM BROMIDE 0.5MG/2.5ML) 0.2 Mg/1 Ml 

Solution, 0.5 MG HHN Q6H PRN for Shortness of Breath, #28 EA


   2/12/20


Docusate Sodium* (DOCUSATE SODIUM*) 100 Mg Capsule, 100 MG ORAL THREE TIMES A 

DAY for constipation, CAP


   2/12/20


Icosapent Ethyl (VASCEPA) 1 Gm Capsule, 1 GM PO for hyperlipidemia, CAP


   2/12/20


Linagliptin (TRADJENTA) 5 Mg Tablet, 5 MG PO for DM, TAB


   2/12/20


Tamsulosin HCl (Flomax) 0.4 Mg Cap.er.24h, 0.4 MG ORAL DAILY for BPH, CAP


   2/12/20


Clopidogrel Bisulfate* (PLAVIX*) 75 Mg Tablet, 75 MG ORAL DAILY for CVA 

prophylaxis, TAB


   2/12/20


Metoprolol Tartrate* (METOPROLOL TARTRATE*) 25 Mg Tablet, 25 MG ORAL EVERY 12 

HOURS for HTN, TAB


   2/12/20


Melatonin (MELATONIN) 5 Mg Tab.rapdis, 5 MG ORAL BEDTIME PRN for Insomnia, TAB


   2/12/20


Bimatoprost (LUMIGAN) 2.5 Ml Drops, 1 DROP BOTH EYES DAILY for glaucoma, #2.5 

ML 0 Refills


   2/12/20


Losartan Potassium* (LOSARTAN POTASSIUM*) 50 Mg Tablet, 50 MG ORAL DAILY for HTN

, TAB


   2/12/20


Atorvastatin Calcium* (LIPITOR*) 20 Mg Tablet, 20 MG ORAL BEDTIME for 

hyperlipidemia, TAB


   2/12/20


Lidocaine Patch* (Lidoderm Patch*) 1 Each Adh..patch, 1 PATCH TOPIC DAILY for 

pain mgt, #30 PATCH


   Patch(es) may remain in place for up to 12 hours in any 24-hour


   period.


   2/12/20


Umeclidinium Bromide (Incruse Ellipta) 62.5 Mcg Blst.w.dev, 62.5 MCG IH for COPD


   2/12/20


Ferrous Sulfate* (FERROUS SULFATE*) 325 Mg Tablet, 220 MG ORAL DAILY for anemia

, #30 TAB 0 Refills


   2/12/20


Donepezil Hcl* (DONEPEZIL HCL*) 10 Mg Tab.rapdis, 10 MG ORAL DAILY for alzheimer

, TAB


   2/12/20


Divalproex Sodium* (DEPAKOTE*) 250 Mg Tablet.dr, 125 MG PO BID for labile mood, 

TAB


   2/12/20


Cholecalciferol (Vitamin D3) (Vitamin D-3) 2,000 Unit Tablet, 2000 UNIT PO for 

supplement, TAB


   2/12/20


Vitamin B Complex (B COMPLEX) 1 Each Tablet, 1 TAB ORAL DAILY for supplement, #

30 TAB 0 Refills


   2/12/20


Pioglitazone Hcl* (ACTOS*) 15 Mg Tablet, 15 MG ORAL DAILY for DM, TAB


   2/12/20


Med list reviewed/reconciled:  Yes


Allergies:  


Coded Allergies:  


     ACETAMINOPHEN (Verified  Allergy, Unknown, 2/12/20)


     HYDROCODONE (Verified  Allergy, Unknown, 2/12/20)





Patient History


Limited by:  medical condition


History Provided By:  Medical Record


[facility] Narrative


PAST MEDICAL HISTORY:  Includes history of pacemaker placement, history of COPD 

exacerbation, chronic kidney disease, and advanced dementia.





PAST SURGICAL HISTORY:  History of pacemaker placement previously and history 

of CABG.


Social History:  Denies: smoking, alcohol use, drug use, other





Review of Systems


All Other Systems:  limited





Physical Exam





Vital Signs








  Date Time  Temp Pulse Resp B/P (MAP) Pulse Ox O2 Delivery O2 Flow Rate FiO2


 


2/12/20 03:52 98.8 99 22 110/50 (70) 83 Room Air  


 


2/12/20 04:00       15.0 


 


2/12/20 19:05        45








Sp02 EP Interpretation:  reviewed


Labs





Laboratory Tests








Test


  2/12/20


12:12 2/12/20


12:15 2/13/20


05:23


 


Arterial Blood pH


  7.331


(7.350-7.450) 


  


 


 


Arterial Blood Partial


Pressure CO2 39.6 mmHg


(35.0-45.0) 


  


 


 


Arterial Blood Partial


Pressure O2 49.6 mmHg


(75.0-100.0) 


  


 


 


Arterial Blood HCO3


  20.5 mmol/L


(22.0-26.0)  L 


  


 


 


Arterial Blood Oxygen


Saturation 82.5 %


()  *L 


  


 


 


Arterial Blood Base Excess -5.0 (-2-2)  L  


 


Shawn Test Positive    


 


White Blood Count


  


  13.2 K/UL


(4.8-10.8)  H 11.2 K/UL


(4.8-10.8)  H


 


Red Blood Count


  


  3.44 M/UL


(4.70-6.10)  L 3.25 M/UL


(4.70-6.10)  L


 


Hemoglobin


  


  10.2 G/DL


(14.2-18.0)  L 10.4 G/DL


(14.2-18.0)  L


 


Hematocrit


  


  32.3 %


(42.0-52.0)  L 30.6 %


(42.0-52.0)  L


 


Mean Corpuscular Volume  94 FL (80-99)   94 FL (80-99)  


 


Mean Corpuscular Hemoglobin


  


  29.5 PG


(27.0-31.0) 31.9 PG


(27.0-31.0)  H


 


Mean Corpuscular Hemoglobin


Concent 


  31.4 G/DL


(32.0-36.0)  L 33.9 G/DL


(32.0-36.0)


 


Red Cell Distribution Width


  


  16.8 %


(11.6-14.8)  H 16.4 %


(11.6-14.8)  H


 


Platelet Count


  


  174 K/UL


(150-450) 244 K/UL


(150-450)


 


Mean Platelet Volume


  


  6.0 FL


(6.5-10.1)  L 5.8 FL


(6.5-10.1)  L


 


Neutrophils (%) (Auto)


  


  % (45.0-75.0)


  % (45.0-75.0)


 


 


Lymphocytes (%) (Auto)


  


  % (20.0-45.0)


  % (20.0-45.0)


 


 


Monocytes (%) (Auto)   % (1.0-10.0)    % (1.0-10.0)  


 


Eosinophils (%) (Auto)   % (0.0-3.0)    % (0.0-3.0)  


 


Basophils (%) (Auto)   % (0.0-2.0)    % (0.0-2.0)  


 


Differential Total Cells


Counted 


  100  


  


 


 


Neutrophils % (Manual)  90 % (45-75)  H 


 


Lymphocytes % (Manual)  8 % (20-45)  L 


 


Monocytes % (Manual)  2 % (1-10)   


 


Eosinophils % (Manual)  0 % (0-3)   


 


Basophils % (Manual)  0 % (0-2)   


 


Band Neutrophils  0 % (0-8)   


 


Platelet Estimate  Adequate   


 


Platelet Morphology  Normal   


 


Hypochromasia  1+   


 


Anisocytosis  1+   


 


Sodium Level


  


  140 MMOL/L


(136-145) 142 MMOL/L


(136-145)


 


Potassium Level


  


  4.4 MMOL/L


(3.5-5.1) 4.2 MMOL/L


(3.5-5.1)


 


Chloride Level


  


  108 MMOL/L


()  H 107 MMOL/L


()


 


Carbon Dioxide Level


  


  23 MMOL/L


(21-32) 24 MMOL/L


(21-32)


 


Anion Gap


  


  9 mmol/L


(5-15) 12 mmol/L


(5-15)


 


Blood Urea Nitrogen


  


  43 mg/dL


(7-18)  H 44 mg/dL


(7-18)  H


 


Creatinine


  


  2.2 MG/DL


(0.55-1.30)  H 1.9 MG/DL


(0.55-1.30)  H


 


Estimat Glomerular Filtration


Rate 


  28.3 mL/min


(>60) 33.5 mL/min


(>60)


 


Glucose Level


  


  217 MG/DL


()  H 283 MG/DL


()  H


 


Calcium Level


  


  8.3 MG/DL


(8.5-10.1)  L 8.6 MG/DL


(8.5-10.1)


 


Troponin I


  


  0.086 ng/mL


(0.000-0.056) 0.082 ng/mL


(0.000-0.056)


 


Pro-B-Type Natriuretic Peptide


  


  1688 pg/mL


(0-125)  H 1805 pg/mL


(0-125)  H


 


Triglycerides Level


  


  


  93 MG/DL


()


 


Cholesterol Level


  


  


  119 MG/DL (<


200)


 


LDL Cholesterol


  


  


  51 mg/dL


(<100)


 


HDL Cholesterol


  


  


  28 MG/DL


(40-60)  L


 


Cholesterol/HDL Ratio   4.3 (3.3-4.4)  








General Appearance:  no apparent distress


Head:  normocephalic


EENT:  PERRL/EOMI


Neck:  supple


Respiratory:  decreased breath sounds


Cardiovascular:  normal rate


Gastrointestinal:  soft


Skin:  normal inspection, normal color, no rash


Lymphatic:  normal inspection, no adenopathy


Current Medications





Current Medications








 Medications


  (Trade)  Dose


 Ordered  Sig/Fito


 Route


 PRN Reason  Start Time


 Stop Time Status Last Admin


Dose Admin


 


 Albuterol/


 Ipratropium


  (Albuterol/


 Ipratropium)  3 ml  Q6HRT


 HHN


   2/12/20 13:00


 2/17/20 12:59  2/13/20 07:18


 


 


 Aspirin


  (ASA)  81 mg  DAILY


 ORAL


   2/13/20 09:00


 3/14/20 08:59   


 


 


 Atorvastatin


 Calcium


  (Lipitor)  20 mg  BEDTIME


 ORAL


   2/12/20 21:00


 3/13/20 20:59   


 


 


 Clopidogrel


 Bisulfate


  (Plavix)  75 mg  DAILY


 ORAL


   2/13/20 09:00


 3/14/20 08:59   


 


 


 Dextrose


  (Dextrose 50%)  25 ml  Q30M  PRN


 IV


 Hypoglycemia  2/12/20 08:45


 3/13/20 08:44   


 


 


 Dextrose


  (Dextrose 50%)  50 ml  Q30M  PRN


 IV


 Hypoglycemia  2/12/20 08:45


 3/13/20 08:44   


 


 


 Divalproex Sodium


  (Depakote


 Sprinkles)  125 mg  BID


 ORAL


   2/12/20 18:00


 3/13/20 17:59   


 


 


 Docusate Sodium


  (Colace)  100 mg  THREE TIMES A  DAY


 ORAL


   2/12/20 13:00


 3/13/20 12:59   


 


 


 Donepezil HCl


  (Aricept)  10 mg  DAILY


 ORAL


   2/13/20 09:00


 3/14/20 08:59   


 


 


 Heparin Sodium


  (Porcine)


  (Heparin 5000


 units/ml)  5,000 units  EVERY 12  HOURS


 SUBQ


   2/12/20 09:00


 3/13/20 08:59  2/12/20 21:34


 


 


 Insulin Aspart


  (NovoLOG)    BEFORE MEALS AND  HS


 SUBQ


   2/12/20 11:30


 3/13/20 11:29  2/12/20 17:21


 


 


 Ipratropium


 Bromide


  (Atrovent)  500 mcg  Q6H  PRN


 HHN


 Shortness of Breath  2/12/20 10:00


 2/17/20 09:59   


 


 


 Lidocaine


  (Lidoderm 5%


 PATCH)  1 patch  DAILY


 TDERMAL


   2/13/20 09:00


 3/14/20 08:59   


 


 


 Linezolid  300 ml @ 


 300 mls/hr  Q12H


 IVPB


   2/12/20 17:00


 2/19/20 16:59  2/13/20 05:00


 


 


 Methylprednisolone


 Sodium Succinate


  (Solu-MEDROL)  40 mg  EVERY 12  HOURS


 IVP


   2/12/20 21:00


 3/13/20 20:59  2/13/20 09:18


 


 


 Metoprolol


 Tartrate


  (Lopressor)  25 mg  EVERY 12  HOURS


 ORAL


   2/12/20 21:00


 3/13/20 20:59   


 


 


 Piperacillin Sod/


 Tazobactam Sod


 3.375 gm/Sodium


 Chloride  110 ml @ 


 27.5 mls/hr  Q8H


 IVPB


   2/12/20 16:00


 2/19/20 15:59  2/13/20 08:00


 


 


 Sodium Chloride  1,000 ml @ 


 50 mls/hr  Q20H


 IV


   2/12/20 12:00


 3/13/20 11:59  2/13/20 08:00


 


 


 Tamsulosin HCl


  (Flomax)  0.4 mg  DAILY


 ORAL


   2/13/20 09:00


 3/14/20 08:59   


 











GI: Plan


Problems:  


(1) Dysphasia


(2) Failure to thrive


(3) Respiratory failure with hypoxia


Plan


History of pacemaker placement, currently on Plavix.


We will attempt for nasogastric tube placement today, will consider Dobbhoff if 

necessary.


Tube feedings per registered dietitian


anemia work up


OB stool r/o GI bleed


monitor H&H, prn transfusions


bowel regimen


ppi


fu labs


We will follow with additional recommendations on a daily basis





Discussed with Dr. Damon.


Thank you for this patient referral, we will follow.





The patient was seen and examined at bedside and all new and available data was 

reviewed in the patients chart. I agree with the above findings, impression 

and plan.  (Patient seen earlier today. Signature stamp does not reflect 

patient encounter time.). - MD Sivan [facility] Feb 13, 2020 11:42

## 2020-02-13 NOTE — PULMONOLOGY PROGRESS NOTE
Assessment/Plan


Problems:  


(1) Basal pneumonia of both lungs


(2) Dysphasia


(3) Leukocytosis


(4) COPD (chronic obstructive pulmonary disease)


(5) JOAQUIN (acute kidney injury)


(6) Respiratory failure with hypoxia


(7) Elevated WBC count


(8) Failure to thrive


Assessment/Plan


ASSESSMENT:  The patient is an 89-year-old male with a history of dementia, 

chronic obstructive pulmonary disease, prior sternotomy, congestive heart 

failure, and sick sinus syndrome, status post pacemaker, presenting with a 

respiratory illness, likely healthcare-associated pneumonia with acute kidney 

injury and dehydration.





PROBLEM LIST:


1. Healthcare-associated pneumonia.


2. Acute hypoxemic respiratory failure secondary to above.


3. Acute kidney injury.


4. Dehydration.


5. Non ST-elevation myocardial infarction, likely demand ischemia.


6. Chronic obstructive pulmonary disease with acute exacerbation.


7. Hypertension.


8. Hypertensive heart disease.


9. Hyperlipidemia.


10. Diabetes type 2.


11. CAD, status post CABG and PCI.


12. History of Mobitz type II heart block and sick sinus syndrome,status post 

pacemaker.


13. Peripheral vascular disease.


14. History of angioedema in the past.


15. Dementia.





TREATMENT PLAN:


1. Optimize pulmonary hygiene/mobilize as tolerated.


2. Titrate down FiO2 to keep saturations greater than 90%.


3. Round-the-clock and p.r.n. bronchodilators.


4. Decreased Solu-Medrol to 30 mg IV b.i.d. and taper.


5. Antibiotics (Zosyn/Zyvox) per ID.


6. Monitor volumes and renal function, mIVF


7. Aspiration precautions, n.p.o., swallow evaluation when more alert. Plan for 

NGT


8. DVT prophylaxis, heparin subcutaneous.


9. Check AMMONIA, CT HEAD


10. Duplex and D-dimer


11. The patient is Full Code, continue to discuss goals of care.





Subjective


Allergies:  


Coded Allergies:  


     ACETAMINOPHEN (Verified  Allergy, Unknown, 2/12/20)


     HYDROCODONE (Verified  Allergy, Unknown, 2/12/20)


Subjective


AFVSS on 8L FM obtunded


No cough no distress no wheezing no FC





Objective





Last 24 Hour Vital Signs








  Date Time  Temp Pulse Resp B/P (MAP) Pulse Ox O2 Delivery O2 Flow Rate FiO2


 


2/13/20 12:49  61 20  99 Simple Mask 8.0 50





  60 20  96   


 


2/13/20 12:04 96.6  19 105/47 (66) 64   


 


2/13/20 12:04       8.0 


 


2/13/20 09:00      Simple Mask 8.0 


 


2/13/20 08:00  71      


 


2/13/20 08:00       8.0 


 


2/13/20 08:00 98.2  19 103/74 (84) 65   


 


2/13/20 07:28  72 18  100 Simple Mask 8.0 50





  61 18  97   


 


2/13/20 07:17     97 Simple Mask 8.0 50


 


2/13/20 04:00       8.0 


 


2/13/20 04:00  69      


 


2/13/20 01:05  75 18  99 Simple Mask 8.0 40





  70 19  97   


 


2/13/20 00:00  84      


 


2/12/20 21:00  97  94/41    


 


2/12/20 21:00      Simple Mask 8.0 


 


2/12/20 20:00       8.0 


 


2/12/20 20:00  62      


 


2/12/20 19:21  72 19  99 Simple Mask 8.0 40





  68 22  97   


 


2/12/20 19:05     97 Simple Mask 8.0 45


 


2/12/20 19:00    115/65 (82)    


 


2/12/20 17:00    125/75 (92) 68   


 


2/12/20 16:00 97.3   112/65 (81) 67   


 


2/12/20 16:00  65      


 


2/12/20 16:00       8.0 


 


2/12/20 15:30    96/54 (68) 66   


 


2/12/20 15:00    102/51 (68) 72   


 


2/12/20 14:30    101/48 (65) 68   


 


2/12/20 14:00    93/50 (64) 92   

















Intake and Output  


 


 2/12/20 2/13/20





 19:00 07:00


 


Output Total 150 ml 600 ml


 


Balance -150 ml -600 ml


 


  


 


Output Urine Total 150 ml 600 ml








General Appearance:  other - obtunded


HEENT:  normocephalic, atraumatic, anicteric, mucous membranes moist


Respiratory/Chest:  rhonchi - distant


Cardiovascular:  normal peripheral pulses, normal rate, regular rhythm


Abdomen:  normal bowel sounds, soft, non tender, no organomegaly, non distended

, no mass


Extremities:  no cyanosis, no clubbing, no edema





Microbiology








 Date/Time


Source Procedure


Growth Status


 


 


 2/12/20 17:50


Sputum Expectorated Gram Stain - Final Resulted


 


 2/12/20 17:50


Sputum Expectorated Sputum Culture


Pending Resulted





 2/12/20 04:24


Nasal Nares - Final Complete


 


 2/12/20 04:24


Nasal Nares - Final Complete


 


 2/12/20 04:00


Rectum  Received








Laboratory Tests


2/13/20 05:23: 


White Blood Count 11.2H, Red Blood Count 3.25L, Hemoglobin 10.4L, Hematocrit 

30.6L, Mean Corpuscular Volume 94, Mean Corpuscular Hemoglobin 31.9H, Mean 

Corpuscular Hemoglobin Concent 33.9, Red Cell Distribution Width 16.4H, 

Platelet Count 244, Mean Platelet Volume 5.8L, Neutrophils (%) (Auto) , 

Lymphocytes (%) (Auto) , Monocytes (%) (Auto) , Eosinophils (%) (Auto) , 

Basophils (%) (Auto) , Sodium Level 142, Potassium Level 4.2, Chloride Level 107

, Carbon Dioxide Level 24, Anion Gap 12, Blood Urea Nitrogen 44H, Creatinine 

1.9H, Estimat Glomerular Filtration Rate 33.5, Glucose Level 283H, Calcium 

Level 8.6, Troponin I 0.082H, Pro-B-Type Natriuretic Peptide 1805H, 

Triglycerides Level 93, Cholesterol Level 119, LDL Cholesterol 51, HDL 

Cholesterol 28L, Cholesterol/HDL Ratio 4.3





Current Medications








 Medications


  (Trade)  Dose


 Ordered  Sig/Fito


 Route


 PRN Reason  Start Time


 Stop Time Status Last Admin


Dose Admin


 


 Albuterol/


 Ipratropium


  (Albuterol/


 Ipratropium)  3 ml  Q6HRT


 HHN


   2/12/20 13:00


 2/17/20 12:59  2/13/20 12:39


 


 


 Aspirin


  (ASA)  81 mg  DAILY


 ORAL


   2/13/20 09:00


 3/14/20 08:59   


 


 


 Atorvastatin


 Calcium


  (Lipitor)  20 mg  BEDTIME


 ORAL


   2/12/20 21:00


 3/13/20 20:59   


 


 


 Clopidogrel


 Bisulfate


  (Plavix)  75 mg  DAILY


 ORAL


   2/13/20 09:00


 3/14/20 08:59   


 


 


 Dextrose


  (Dextrose 50%)  25 ml  Q30M  PRN


 IV


 Hypoglycemia  2/12/20 08:45


 3/13/20 08:44   


 


 


 Dextrose


  (Dextrose 50%)  50 ml  Q30M  PRN


 IV


 Hypoglycemia  2/12/20 08:45


 3/13/20 08:44   


 


 


 Divalproex Sodium


  (Depakote


 Sprinkles)  125 mg  BID


 ORAL


   2/12/20 18:00


 3/13/20 17:59   


 


 


 Docusate Sodium


  (Colace)  100 mg  THREE TIMES A  DAY


 ORAL


   2/12/20 13:00


 3/13/20 12:59   


 


 


 Donepezil HCl


  (Aricept)  10 mg  DAILY


 ORAL


   2/13/20 09:00


 3/14/20 08:59   


 


 


 Heparin Sodium


  (Porcine)


  (Heparin 5000


 units/ml)  5,000 units  EVERY 12  HOURS


 SUBQ


   2/12/20 09:00


 3/13/20 08:59  2/12/20 21:34


 


 


 Insulin Aspart


  (NovoLOG)    BEFORE MEALS AND  HS


 SUBQ


   2/12/20 11:30


 3/13/20 11:29  2/12/20 17:21


 


 


 Ipratropium


 Bromide


  (Atrovent)  500 mcg  Q6H  PRN


 HHN


 Shortness of Breath  2/12/20 10:00


 2/17/20 09:59   


 


 


 Lidocaine


  (Lidoderm 5%


 PATCH)  1 patch  DAILY


 TDERMAL


   2/13/20 09:00


 3/14/20 08:59   


 


 


 Linezolid  300 ml @ 


 300 mls/hr  Q12H


 IVPB


   2/12/20 17:00


 2/19/20 16:59  2/13/20 05:00


 


 


 Methylprednisolone


 Sodium Succinate


  (Solu-MEDROL)  40 mg  EVERY 12  HOURS


 IVP


   2/12/20 21:00


 3/13/20 20:59  2/13/20 09:18


 


 


 Metoprolol


 Tartrate


  (Lopressor)  25 mg  EVERY 12  HOURS


 ORAL


   2/12/20 21:00


 3/13/20 20:59   


 


 


 Piperacillin Sod/


 Tazobactam Sod


 3.375 gm/Sodium


 Chloride  110 ml @ 


 27.5 mls/hr  Q8H


 IVPB


   2/12/20 16:00


 2/19/20 15:59  2/13/20 08:00


 


 


 Sodium Chloride  1,000 ml @ 


 50 mls/hr  Q20H


 IV


   2/12/20 12:00


 3/13/20 11:59  2/13/20 08:00


 


 


 Tamsulosin HCl


  (Flomax)  0.4 mg  DAILY


 ORAL


   2/13/20 09:00


 3/14/20 08:59   


 

















Mika Aguirre MD Feb 13, 2020 13:53

## 2020-02-13 NOTE — DIAGNOSTIC IMAGING REPORT
Indication: Post nasogastric tube placement

 

Technique: Supine view of the abdomen

 

Comparison: none

 

Findings: There is a nasogastric tube in place. This makes a hairpin loop in the

distal esophagus, tip pointed cephalad, projected beyond the upper edge of the image

but probably within the hypopharynx or cervical esophagus.

 

The bowel gas pattern is unremarkable. There is evidence of prior median sternotomy

and there is a left chest pacemaker

 

Impression: Malposition of nasogastric tube, as described. Removal and replacement

recommended. This critical finding was phoned to patient's nurse at the time of

interpretation

## 2020-02-13 NOTE — NUR
*-*DISCHARGE PANNING*-*





S/W ADMISSIONS @ [facility] WHO CONFIRMED THEY WILL ACCEPT PATIENT BACK UPON 
DISCHARGE



[facility]

P:[phone]

F:[phone]

## 2020-02-13 NOTE — NUR
RD ASSESSMENT & RECOMMENDATIONS

SEE CARE ACTIVITY FOR COMPLETE ASSESSMENT



DAILY ESTIMATED NEEDS:

Needs based on DM, pulmonary, wound/ 72.7kg 

25-30  kcals/kg 

1286-6314  total kcals

1.25-1.5  g protein/kg

  g total protein 

25-30  mL/kg

2246-1212  total fluid mLs



NUTRITION DIAGNOSIS:

Swallowing difficulty R/T dysphagia as evidenced by pt on pureed w/ NTL

diet PTA, SLP recommends temporary nonoral feeding at this time, pending

NGT insertion, NPO at this time.

 



CURRENT TF:NPO 



 



ENTERAL NUTRITION RECOMMENDATIONS:

Glucerna 1.5 @ 55ml/hr x 24 hrs  to provide 1320ml, 1980kcal, 109g prot, 1002ml free water 



* W/ GI access, initiate Glucerna 1.5 @ 15ml/hr x 6hrs

* Advance 10ml q 4-6 hrs as tolerated to goal rate

* HOB over 30 degrees

* Without IVF, H20 flush of 150ml q 4 hrs



 



ADDITIONAL RECOMMENDATIONS:

* Maintain calibrated bedscale wt (SNF ub=061# on 2/5, bedscale wt=160#) 

* Monitor lytes w/ TF, replete as needed 

* Monitor BGs closely w/ Solumedrol, need for long acting insulin  

* Monitor SLP eval, for possible oral diet : SLP recommends NPO at this anil 

* F/up w/ wound care eval 

     -> add Vit C 250mg QD + Maikel 1pkt BID (via NGT, mix w/ 4oz water)

## 2020-02-13 NOTE — NUR
NURSE NOTES:WOUND CARE NOTES:Unable to see pt today .Per Primary nurse pt is not stable . 
Will follow-up in a.m.

## 2020-02-13 NOTE — CARDIAC ELECTROPHYSIOLOGY PN
Assessment/Plan


Assessment/Plan


1. Non-ST elevation myocardial infarction likely type 2 in view of the


patient's renal failure.  This is likely due to demand ischemia in view of


patient's sepsis, white count of 20,000.  The patient also has renal


failure with creatinine of 2.8.  Echo  showed Nl EF 55%. LE duplex no DVT


On aspirin, beta-blocker, and statin. Hx of CABG





2. Status post pacemaker.  We will have to find out the brand as the son


does not know the brand of the pacemaker and the patient is nonverbal.





3. Sepsis, COPD, pneumonia.  The patient is on ceftriaxone, azithromycin.


4. Advanced dementia.


5. Dysphagia, NGT placement FU Dr. Damon Family refused PEG





Subjective


Subjective


No events. Had NGT replacement. A pacing on tele.





Objective





Last 24 Hour Vital Signs








  Date Time  Temp Pulse Resp B/P (MAP) Pulse Ox O2 Delivery O2 Flow Rate FiO2


 


2/13/20 12:49  61 20  99 Simple Mask 8.0 50





  60 20  96   


 


2/13/20 12:04 96.6  19 105/47 (66) 64   


 


2/13/20 12:04       8.0 


 


2/13/20 12:00  79      


 


2/13/20 09:00      Simple Mask 8.0 


 


2/13/20 08:00  71      


 


2/13/20 08:00       8.0 


 


2/13/20 08:00 98.2  19 103/74 (84) 65   


 


2/13/20 07:28  72 18  100 Simple Mask 8.0 50





  61 18  97   


 


2/13/20 07:17     97 Simple Mask 8.0 50


 


2/13/20 04:00       8.0 


 


2/13/20 04:00  69      


 


2/13/20 01:05  75 18  99 Simple Mask 8.0 40





  70 19  97   


 


2/13/20 00:00  84      


 


2/12/20 21:00  97  94/41    


 


2/12/20 21:00      Simple Mask 8.0 


 


2/12/20 20:00       8.0 


 


2/12/20 20:00  62      


 


2/12/20 19:21  72 19  99 Simple Mask 8.0 40





  68 22  97   


 


2/12/20 19:05     97 Simple Mask 8.0 45


 


2/12/20 19:00    115/65 (82)    


 


2/12/20 17:00    125/75 (92) 68   


 


2/12/20 16:00 97.3   112/65 (81) 67   


 


2/12/20 16:00  65      


 


2/12/20 16:00       8.0 

















Intake and Output  


 


 2/12/20 2/13/20





 19:00 07:00


 


Output Total 150 ml 600 ml


 


Balance -150 ml -600 ml


 


  


 


Output Urine Total 150 ml 600 ml











Laboratory Tests








Test


  2/13/20


05:23


 


White Blood Count


  11.2 K/UL


(4.8-10.8)  H


 


Red Blood Count


  3.25 M/UL


(4.70-6.10)  L


 


Hemoglobin


  10.4 G/DL


(14.2-18.0)  L


 


Hematocrit


  30.6 %


(42.0-52.0)  L


 


Mean Corpuscular Volume 94 FL (80-99)  


 


Mean Corpuscular Hemoglobin


  31.9 PG


(27.0-31.0)  H


 


Mean Corpuscular Hemoglobin


Concent 33.9 G/DL


(32.0-36.0)


 


Red Cell Distribution Width


  16.4 %


(11.6-14.8)  H


 


Platelet Count


  244 K/UL


(150-450)


 


Mean Platelet Volume


  5.8 FL


(6.5-10.1)  L


 


Neutrophils (%) (Auto)


  % (45.0-75.0)


 


 


Lymphocytes (%) (Auto)


  % (20.0-45.0)


 


 


Monocytes (%) (Auto)  % (1.0-10.0)  


 


Eosinophils (%) (Auto)  % (0.0-3.0)  


 


Basophils (%) (Auto)  % (0.0-2.0)  


 


Sodium Level


  142 MMOL/L


(136-145)


 


Potassium Level


  4.2 MMOL/L


(3.5-5.1)


 


Chloride Level


  107 MMOL/L


()


 


Carbon Dioxide Level


  24 MMOL/L


(21-32)


 


Anion Gap


  12 mmol/L


(5-15)


 


Blood Urea Nitrogen


  44 mg/dL


(7-18)  H


 


Creatinine


  1.9 MG/DL


(0.55-1.30)  H


 


Estimat Glomerular Filtration


Rate 33.5 mL/min


(>60)


 


Glucose Level


  283 MG/DL


()  H


 


Calcium Level


  8.6 MG/DL


(8.5-10.1)


 


Troponin I


  0.082 ng/mL


(0.000-0.056)


 


Pro-B-Type Natriuretic Peptide


  1805 pg/mL


(0-125)  H


 


Triglycerides Level


  93 MG/DL


()


 


Cholesterol Level


  119 MG/DL (<


200)


 


LDL Cholesterol


  51 mg/dL


(<100)


 


HDL Cholesterol


  28 MG/DL


(40-60)  L


 


Cholesterol/HDL Ratio 4.3 (3.3-4.4)  











Microbiology








 Date/Time


Source Procedure


Growth Status


 


 


 2/12/20 17:50


Sputum Expectorated Gram Stain - Final Resulted


 


 2/12/20 17:50


Sputum Expectorated Sputum Culture


Pending Resulted





 2/12/20 04:24


Nasal Nares - Final Complete


 


 2/12/20 04:24


Nasal Nares - Final Complete


 


 2/12/20 04:00


Rectum  Received








Objective


HEAD AND NECK:  No JVD.NGT in [place


LUNGS:  Coarse rhonchi.


CARDIOVASCULAR:  Regular S1 and S2 with no gallop or murmur.


ABDOMEN:  Soft.


EXTREMITIES:  No pitting edema.











Bob Fuentes MD Feb 13, 2020 15:49

## 2020-02-14 VITALS — DIASTOLIC BLOOD PRESSURE: 70 MMHG | SYSTOLIC BLOOD PRESSURE: 158 MMHG

## 2020-02-14 VITALS — SYSTOLIC BLOOD PRESSURE: 157 MMHG | DIASTOLIC BLOOD PRESSURE: 77 MMHG

## 2020-02-14 VITALS — SYSTOLIC BLOOD PRESSURE: 119 MMHG | DIASTOLIC BLOOD PRESSURE: 49 MMHG

## 2020-02-14 VITALS — SYSTOLIC BLOOD PRESSURE: 131 MMHG | DIASTOLIC BLOOD PRESSURE: 68 MMHG

## 2020-02-14 VITALS — SYSTOLIC BLOOD PRESSURE: 150 MMHG | DIASTOLIC BLOOD PRESSURE: 70 MMHG

## 2020-02-14 LAB
ADD MANUAL DIFF: YES
ANION GAP SERPL CALC-SCNC: 11 MMOL/L (ref 5–15)
BUN SERPL-MCNC: 32 MG/DL (ref 7–18)
CALCIUM SERPL-MCNC: 8.8 MG/DL (ref 8.5–10.1)
CHLORIDE SERPL-SCNC: 108 MMOL/L (ref 98–107)
CO2 SERPL-SCNC: 23 MMOL/L (ref 21–32)
CREAT SERPL-MCNC: 1.5 MG/DL (ref 0.55–1.3)
ERYTHROCYTE [DISTWIDTH] IN BLOOD BY AUTOMATED COUNT: 16.3 % (ref 11.6–14.8)
HCT VFR BLD CALC: 28.5 % (ref 42–52)
HGB BLD-MCNC: 9.5 G/DL (ref 14.2–18)
MCV RBC AUTO: 93 FL (ref 80–99)
PLATELET # BLD: 270 K/UL (ref 150–450)
POTASSIUM SERPL-SCNC: 3.6 MMOL/L (ref 3.5–5.1)
RBC # BLD AUTO: 3.06 M/UL (ref 4.7–6.1)
SODIUM SERPL-SCNC: 142 MMOL/L (ref 136–145)
WBC # BLD AUTO: 11.9 K/UL (ref 4.8–10.8)

## 2020-02-14 RX ADMIN — INSULIN ASPART SCH UNITS: 100 INJECTION, SOLUTION INTRAVENOUS; SUBCUTANEOUS at 11:30

## 2020-02-14 RX ADMIN — METHYLPREDNISOLONE SODIUM SUCCINATE SCH MG: 40 INJECTION, POWDER, LYOPHILIZED, FOR SOLUTION INTRAMUSCULAR; INTRAVENOUS at 11:18

## 2020-02-14 RX ADMIN — DEXTROSE MONOHYDRATE SCH MLS/HR: 50 INJECTION, SOLUTION INTRAVENOUS at 16:00

## 2020-02-14 RX ADMIN — DEXTROSE MONOHYDRATE SCH MLS/HR: 50 INJECTION, SOLUTION INTRAVENOUS at 11:17

## 2020-02-14 RX ADMIN — METHYLPREDNISOLONE SODIUM SUCCINATE SCH MG: 40 INJECTION, POWDER, LYOPHILIZED, FOR SOLUTION INTRAMUSCULAR; INTRAVENOUS at 09:00

## 2020-02-14 RX ADMIN — DEXTROSE MONOHYDRATE SCH MLS/HR: 50 INJECTION, SOLUTION INTRAVENOUS at 23:14

## 2020-02-14 RX ADMIN — DONEPEZIL HYDROCHLORIDE SCH MG: 10 TABLET, FILM COATED ORAL at 09:00

## 2020-02-14 RX ADMIN — METHYLPREDNISOLONE SODIUM SUCCINATE SCH MG: 40 INJECTION, POWDER, LYOPHILIZED, FOR SOLUTION INTRAMUSCULAR; INTRAVENOUS at 23:10

## 2020-02-14 RX ADMIN — IPRATROPIUM BROMIDE AND ALBUTEROL SULFATE SCH ML: .5; 3 SOLUTION RESPIRATORY (INHALATION) at 13:18

## 2020-02-14 RX ADMIN — INSULIN ASPART SCH UNITS: 100 INJECTION, SOLUTION INTRAVENOUS; SUBCUTANEOUS at 16:30

## 2020-02-14 RX ADMIN — INSULIN ASPART SCH UNITS: 100 INJECTION, SOLUTION INTRAVENOUS; SUBCUTANEOUS at 06:30

## 2020-02-14 RX ADMIN — INSULIN ASPART SCH UNITS: 100 INJECTION, SOLUTION INTRAVENOUS; SUBCUTANEOUS at 22:09

## 2020-02-14 RX ADMIN — IPRATROPIUM BROMIDE AND ALBUTEROL SULFATE SCH ML: .5; 3 SOLUTION RESPIRATORY (INHALATION) at 07:35

## 2020-02-14 RX ADMIN — DOCUSATE SODIUM SCH MG: 100 CAPSULE, LIQUID FILLED ORAL at 13:00

## 2020-02-14 RX ADMIN — IPRATROPIUM BROMIDE AND ALBUTEROL SULFATE SCH ML: .5; 3 SOLUTION RESPIRATORY (INHALATION) at 01:21

## 2020-02-14 RX ADMIN — HEPARIN SODIUM SCH UNITS: 5000 INJECTION INTRAVENOUS; SUBCUTANEOUS at 21:00

## 2020-02-14 RX ADMIN — DOCUSATE SODIUM SCH MG: 100 CAPSULE, LIQUID FILLED ORAL at 18:00

## 2020-02-14 RX ADMIN — DIVALPROEX SODIUM SCH MG: 125 CAPSULE ORAL at 11:00

## 2020-02-14 RX ADMIN — ASPIRIN 81 MG SCH MG: 81 TABLET ORAL at 09:00

## 2020-02-14 RX ADMIN — TAMSULOSIN HYDROCHLORIDE SCH MG: 0.4 CAPSULE ORAL at 09:00

## 2020-02-14 RX ADMIN — DOCUSATE SODIUM SCH MG: 100 CAPSULE, LIQUID FILLED ORAL at 09:00

## 2020-02-14 RX ADMIN — DEXTROSE MONOHYDRATE SCH MLS/HR: 50 INJECTION, SOLUTION INTRAVENOUS at 08:00

## 2020-02-14 RX ADMIN — LORAZEPAM PRN MG: 2 INJECTION, SOLUTION INTRAMUSCULAR; INTRAVENOUS at 19:12

## 2020-02-14 RX ADMIN — DIVALPROEX SODIUM SCH MG: 125 CAPSULE ORAL at 21:58

## 2020-02-14 RX ADMIN — HEPARIN SODIUM SCH UNITS: 5000 INJECTION INTRAVENOUS; SUBCUTANEOUS at 10:51

## 2020-02-14 RX ADMIN — ATORVASTATIN CALCIUM SCH MG: 20 TABLET, FILM COATED ORAL at 21:57

## 2020-02-14 RX ADMIN — IPRATROPIUM BROMIDE AND ALBUTEROL SULFATE SCH ML: .5; 3 SOLUTION RESPIRATORY (INHALATION) at 19:31

## 2020-02-14 NOTE — DIAGNOSTIC IMAGING REPORT
EXAM:

  CT Pelvis Without Intravenous Contrast

 

CLINICAL HISTORY:

  FALL

 

TECHNIQUE:

  Axial computed tomography images of the pelvis without intravenous 

contrast.  CTDI is 10 mGy and DLP is 593 mGy-cm.  One or more of the 

following dose reduction techniques were used: automated exposure control,

 adjustment of the mA and/or kV according to patient size, use of 

iterative reconstruction technique.

 

COMPARISON:

  No relevant prior studies available.

 

FINDINGS:

  Limitations:  Limited due to metallic artifact.

  Bowel:  Rectal fecal impaction.

  Appendix:  Appendix not identified.

  Intraperitoneal space:  Unremarkable.

  Bladder:  Malpositioned Rapp catheter balloon in membranous/bulbar 

urethra.  Mild densities in the bladder which may be hemorrhage, lesion, 

debris, or other etiology.  Distended bladder.

  Reproductive:  Unremarkable as visualized.

  Bones/joints:  No acute fracture or dislocation.  Left proximal femoral 

surgical fixation.

  Soft tissues:  Anasarca.

  Vasculature:  Atherosclerosis.

  Lymph nodes:  Unremarkable.

 

IMPRESSION:     

1.  No acute fracture or dislocation.

2.  Malpositioned Rapp catheter balloon in membranous/bulbar urethra.

3.  Mild densities in the bladder which may be hemorrhage, lesion, debris,

 or other etiology.

4.  Rectal fecal impaction.

 

<MYCVCSECTION>

 

Communications:

 

02/14/20 20:41 Verify Receipt with Nurse Verified receipt with  2 Arron Chaidez  on 02/14 20:41 (-08:00)

## 2020-02-14 NOTE — NUR
NURSE NOTES:  Found patient pulling at maldonado catheter.  Patient would not let go after being 
instructed to do so.

## 2020-02-14 NOTE — NUR
NURSE NOTES:

per night  RN, she endorsed to me that pt has been attempting to pull maldonado all night even 
while having restrains on.  

Also wound nurse caught him pulling on catheter.  Per pt's son my dad suffers from urinary 
retention, "My dad does not like the catheter, he had at at [facility] and he was also 
attempting to pull catheter all the time.

## 2020-02-14 NOTE — NUR
SWALLOW STATUS/RE/EVALUATION:



S:  PATIENT ALERT, AWAKE, ORIENTED TO TASK.  HIS SON WAS PRESENT TO PROVIDE

    BACKGROUND INFORMATION.  PER CHART REVIEW, PATIENT ON PUREE, NECTAR

    THICK DIET, NO STRAWS, NUTRITIONAL SUPPLEMENTS IN SNF.



O:  RE/EVALUATION OF OROPHARYNGEAL PHASE OF SWALLOW



A:  PATIENT GIVEN 4 OZ TRIAL OF PUREE, NTL IN 5ML AMOUNTS VIA SPOON.  HE PRESENTS WITH 
MILD/MOD

    ORAL AND SUSPECTED PHARYNGEAL PHASE DYSPHAGIA WITH DECREASED 

    MENTATION WHICH LED TO POOR BOLUS ACCEPTANCE AND SENSORIMOTOR

    DEFICITS RESULTING IN DELAYS IN ORAL PREPARATION/ORAL TRANSIT OF

    BOLUS AND IN INITIATION OF PHARYNGEAL PHASE OF SWALLOW.  PATIENT

    ENDENTULOUS.  XEROSTOMIA NOTED.  NO ANTERIOR SPILLAGE OR RESIDUE

    REMAINING ON LINGUAL SURFACE POST SWALLOW.

    CLEAR UPPER AIRWAY SOUNDS PRE/POST SWALLOW.  NO OVERT S/S OF ASPIRATION WITH P.O. 
TRIALS.

    PT ON 2LIT O2 SUPPORT VIA NC.



P:  PATIENT PRESENTS AS SAFE TO RESUME MODIFIED TEXTURE DIET

    MEALTIME PROTOCOL POSTED AT BEDSIDE TO MINIMIZE RISK OF

    ASPIRATION

    TOTAL ASSIST WITH MEALS

    CRUSH CRUSHABLE MEDS/PRESENT IN PUREE

    RD CONSULT RE: NUTRITIONAL SUPPLEMENTATION (IN THE SNF HE

    RECEIVED BOOST WITH MEALS BID)

    ST TO FOLLOW FOR DIET TOLERANCE AND VIDEO SWALLOW STUDY IF NEEDED



THANK YOU FOR THIS REFERRAL.

## 2020-02-14 NOTE — NUR
Spoke with TOMASZ Quesada regarding order for V/Q scan. Per Sangita, pt does not have IV access at 
this time. Pt is also restless and easily becomes agitated. Pt is unable to follow 
instructions. Sangita to contact nuclear medicine department if pt is calm and able to 
perform V/Q scan. 

-------------------------------------------------------------------------------

Addendum: 02/14/20 at 1440 by ZHAO RAMSEY

-------------------------------------------------------------------------------

NM Lung V/Q Scan complete

## 2020-02-14 NOTE — INFECTIOUS DISEASES PROG NOTE
Assessment/Plan


Problems:  


(1) Basal pneumonia of both lungs


Assessment & Plan:  confirmed on CT chest , with cough and congestion , suspect 

aspiration , due to pseudomonas spp already on zosyn pending final sputum 

culture , aspiration precaution, keep HOB > 30 degree. stop zyvox 





(2) COPD (chronic obstructive pulmonary disease)


Assessment & Plan:  with exacerbation , continue antibiotics , inhalers , 

pending  sputum culture 





(3) Leukocytosis


Assessment & Plan:  possible sepsis due to the above, on zosyn and zyvox  with 

negative blood culture for 24 hrs , will stop zyvox  





(4) JOAQUIN (acute kidney injury)


Assessment & Plan:  continue hydration, with close monitoring of renal function 

, avoid nephrotoxics 





(5) Respiratory failure with hypoxia


Assessment & Plan:  suspect due to the above, will order V/Q scan to rule out 

PE as an etiology , and CT chest to rule out lungs pathology , recommend 

pulmonary eval 








Subjective


ROS Limited/Unobtainable:  Yes


Allergies:  


Coded Allergies:  


     ACETAMINOPHEN (Verified  Allergy, Unknown, 2/12/20)


     HYDROCODONE (Verified  Allergy, Unknown, 2/12/20)


Subjective


He was still altered and unresponsive, on high flow oxygen but less 

requirements , on restrains, A febrile , still congested





Objective


Vital Signs





Last 24 Hour Vital Signs








  Date Time  Temp Pulse Resp B/P (MAP) Pulse Ox O2 Delivery O2 Flow Rate FiO2


 


2/14/20 13:18  81 18  99 Nasal Cannula 2.0 28





  79 18  95   


 


2/14/20 11:30 98.2 70 19 131/68 (89) 94   


 


2/14/20 09:00      Nasal Cannula 2.0 


 


2/14/20 07:45  104 18  98 Nasal Cannula 2.0 28





  83 18  97   


 


2/14/20 07:45     97 Nasal Cannula 2.0 28


 


2/14/20 04:26  86      


 


2/14/20 04:00 97.5 84 20 157/77 (103) 97   


 


2/14/20 01:21  62 20  99 Simple Mask 8.0 50





  60 20  96   


 


2/14/20 00:00  96      


 


2/14/20 00:00 97.2 66 17 119/49 (72) 97   


 


2/13/20 21:00      Simple Mask 8.0 


 


2/13/20 21:00  64  116/46    


 


2/13/20 20:00 97.5 64 18 116/44 (68) 96   


 


2/13/20 20:00  74      


 


2/13/20 19:40     97 Simple Mask 8.0 50


 


2/13/20 19:40  66 20  99 Simple Mask 8.0 50





  64 20  97   








Height (Feet):  5


Height (Inches):  11.00


Weight (Pounds):  200


General Appearance:  no acute distress, cachetic


HEENT:  normocephalic, atraumatic, anicteric, mucous membranes moist, PERRL


Respiratory/Chest:  chest wall non-tender, no respiratory distress, no 

accessory muscle use, decreased breath sounds


Cardiovascular:  normal peripheral pulses, normal rate, regular rhythm, no 

gallop/murmur, no JVD


Abdomen:  normal bowel sounds, soft, non tender, no organomegaly, non distended

, no mass, no scars


Extremities:  no cyanosis, no clubbing


Skin:  no rash, no lesions


Neurologic/Psychiatric:  alert, responsive


Lymphatic:  no neck adenopathy, no groin adenopathy


Musculoskeletal:  normal muscle bulk, no effusion





Microbiology








 Date/Time


Source Procedure


Growth Status


 


 


 2/12/20 12:15


Blood Blood Culture - Preliminary


NO GROWTH AFTER 24 HOURS Resulted


 


 2/12/20 12:00


Blood Blood Culture - Preliminary


NO GROWTH AFTER 24 HOURS Resulted





 2/12/20 17:50


Sputum Expectorated Gram Stain - Final Resulted


 


 2/12/20 17:50 Sputum Culture - Preliminary


Pseudomonas Species Resulted





 2/12/20 04:24


Nasal Nares - Final Complete


 


 2/12/20 04:24


Nasal Nares - Final Complete


 


 2/12/20 04:00


Nasal Nares MRSA Culture - Final


NO METHICILLIN RESISTANT STAPH AUREUS... Complete


 


 2/12/20 04:00


Rectum - Final


NO CARBAPENEM-RESISTANT ENTEROBACTERI... Complete


 


 2/12/20 04:00


Rectum VRE Culture - Final


Enterococcus Faecalis - Vre Complete











Laboratory Tests








Test


  2/14/20


05:45


 


White Blood Count


  11.9 K/UL


(4.8-10.8)  H


 


Red Blood Count


  3.06 M/UL


(4.70-6.10)  L


 


Hemoglobin


  9.5 G/DL


(14.2-18.0)  L


 


Hematocrit


  28.5 %


(42.0-52.0)  L


 


Mean Corpuscular Volume 93 FL (80-99)  


 


Mean Corpuscular Hemoglobin


  31.1 PG


(27.0-31.0)  H


 


Mean Corpuscular Hemoglobin


Concent 33.5 G/DL


(32.0-36.0)


 


Red Cell Distribution Width


  16.3 %


(11.6-14.8)  H


 


Platelet Count


  270 K/UL


(150-450)


 


Mean Platelet Volume


  5.7 FL


(6.5-10.1)  L


 


Neutrophils (%) (Auto)


  % (45.0-75.0)


 


 


Lymphocytes (%) (Auto)


  % (20.0-45.0)


 


 


Monocytes (%) (Auto)  % (1.0-10.0)  


 


Eosinophils (%) (Auto)  % (0.0-3.0)  


 


Basophils (%) (Auto)  % (0.0-2.0)  


 


Differential Total Cells


Counted 100  


 


 


Neutrophils % (Manual) 91 % (45-75)  H


 


Lymphocytes % (Manual) 6 % (20-45)  L


 


Monocytes % (Manual) 3 % (1-10)  


 


Eosinophils % (Manual) 0 % (0-3)  


 


Basophils % (Manual) 0 % (0-2)  


 


Band Neutrophils 0 % (0-8)  


 


Platelet Estimate Adequate  


 


Platelet Morphology Normal  


 


Anisocytosis 1+  


 


Sodium Level


  142 MMOL/L


(136-145)


 


Potassium Level


  3.6 MMOL/L


(3.5-5.1)


 


Chloride Level


  108 MMOL/L


()  H


 


Carbon Dioxide Level


  23 MMOL/L


(21-32)


 


Anion Gap


  11 mmol/L


(5-15)


 


Blood Urea Nitrogen


  32 mg/dL


(7-18)  H


 


Creatinine


  1.5 MG/DL


(0.55-1.30)  H


 


Estimat Glomerular Filtration


Rate 44.1 mL/min


(>60)


 


Glucose Level


  258 MG/DL


()  H


 


Calcium Level


  8.8 MG/DL


(8.5-10.1)











Current Medications








 Medications


  (Trade)  Dose


 Ordered  Sig/Fito


 Route


 PRN Reason  Start Time


 Stop Time Status Last Admin


Dose Admin


 


 Albuterol/


 Ipratropium


  (Albuterol/


 Ipratropium)  3 ml  Q6HRT


 HHN


   2/12/20 13:00


 2/17/20 12:59  2/14/20 13:18


 


 


 Aspirin


  (ASA)  81 mg  DAILY


 ORAL


   2/13/20 09:00


 3/14/20 08:59   


 


 


 Atorvastatin


 Calcium


  (Lipitor)  20 mg  BEDTIME


 ORAL


   2/12/20 21:00


 3/13/20 20:59   


 


 


 Clopidogrel


 Bisulfate


  (Plavix)  75 mg  DAILY


 ORAL


   2/13/20 09:00


 3/14/20 08:59   


 


 


 Dextrose


  (Dextrose 50%)  25 ml  Q30M  PRN


 IV


 Hypoglycemia  2/12/20 08:45


 3/13/20 08:44   


 


 


 Dextrose


  (Dextrose 50%)  50 ml  Q30M  PRN


 IV


 Hypoglycemia  2/12/20 08:45


 3/13/20 08:44   


 


 


 Divalproex Sodium


  (Depakote


 Sprinkles)  125 mg  Q12HR


 ORAL


   2/14/20 11:00


 3/13/20 17:59   


 


 


 Docusate Sodium


  (Colace)  100 mg  THREE TIMES A  DAY


 ORAL


   2/12/20 13:00


 3/13/20 12:59   


 


 


 Donepezil HCl


  (Aricept)  10 mg  DAILY


 ORAL


   2/13/20 09:00


 3/14/20 08:59   


 


 


 Heparin Sodium


  (Porcine)


  (Heparin 5000


 units/ml)  5,000 units  EVERY 12  HOURS


 SUBQ


   2/12/20 09:00


 3/13/20 08:59  2/14/20 10:51


 


 


 Insulin Aspart


  (NovoLOG)    BEFORE MEALS AND  HS


 SUBQ


   2/12/20 11:30


 3/13/20 11:29  2/12/20 17:21


 


 


 Ipratropium


 Bromide


  (Atrovent)  500 mcg  Q6H  PRN


 HHN


 Shortness of Breath  2/12/20 10:00


 2/17/20 09:59   


 


 


 Lidocaine


  (Lidoderm 5%


 PATCH)  1 patch  DAILY


 TDERMAL


   2/13/20 09:00


 3/14/20 08:59  2/14/20 10:52


 


 


 Lorazepam


  (Ativan 2mg/ml


 1ml)  0.25 mg  Q12H  PRN


 IV


 Agitation  2/14/20 09:15


 2/21/20 09:14   


 


 


 Methylprednisolone


 Sodium Succinate


  (Solu-MEDROL)  30 mg  EVERY 12  HOURS


 IVP


   2/13/20 21:00


 3/13/20 20:59  2/14/20 11:18


 


 


 Metoprolol


 Tartrate


  (Lopressor)  25 mg  EVERY 12  HOURS


 ORAL


   2/12/20 21:00


 3/13/20 20:59   


 


 


 Piperacillin Sod/


 Tazobactam Sod


 3.375 gm/Sodium


 Chloride  110 ml @ 


 27.5 mls/hr  Q8H


 IVPB


   2/12/20 16:00


 2/19/20 15:59  2/14/20 11:17


 


 


 Sodium Chloride  1,000 ml @ 


 50 mls/hr  Q20H


 IV


   2/12/20 12:00


 3/13/20 11:59  2/14/20 03:49


 


 


 Tamsulosin HCl


  (Flomax)  0.4 mg  DAILY


 ORAL


   2/13/20 09:00


 3/14/20 08:59   


 

















Ana Alex M.D. Feb 14, 2020 17:47

## 2020-02-14 NOTE — GENERAL PROGRESS NOTE
Assessment/Plan


Status:  stable


Assessment/Plan:


1. Pneumonia - cont IV Abx 


2. Leukocytosis improving, possible sepsis - cont IV abx.


3. Elevated Troponin 2nd to NSTEMI due to demand Ischemia most likely due to 

KELVIN and sepsis - cardiology following.


4. COPD exacerbation - on solumedrol and pulmonary following.


5. Hypotension 2nd to sepsis and pneumonia - improving.  will hold hypertensive 

meds for now.


6. Dementia - will restart home meds after NG tube placement by Dr Segovia. 

failed NG tube x 2.  Now that he is awake, will recheck Swallowing eval. 


7. Acute Hypoxemic respiratory Failure 2nd to pneumonia and sepsis


8. JOAQUIN - improving with hydration.


9. Hypertensive heart disease


10. HLD - will resume med after NG tube placement. 


11. DM II - Insulin on hold until NG tube is placed.


12. CAD s/p CABG and stent placement.


13. H/O PVD


14. H/O angioedema in the past.





Subjective


Date patient seen:  Feb 14, 2020


Time patient seen:  08:40


Constitutional:  Reports: no symptoms


HEENT:  Reports: no symptoms


Cardiovascular:  Reports: no symptoms


Respiratory:  Reports: no symptoms


Gastrointestinal/Abdominal:  Reports: no symptoms


Genitourinary:  Reports: no symptoms


Neurologic/Psychiatric:  Reports: no symptoms


Endocrine:  Reports: no symptoms


Hematologic/Lymphatic:  Reports: no symptoms


Allergies:  


Coded Allergies:  


     ACETAMINOPHEN (Verified  Allergy, Unknown, 2/12/20)


     HYDROCODONE (Verified  Allergy, Unknown, 2/12/20)


Subjective


This morning he is aggitated and fully awake. He pulled his IV line.


Afebrile and His WBC improving.





Objective





Last 24 Hour Vital Signs








  Date Time  Temp Pulse Resp B/P (MAP) Pulse Ox O2 Delivery O2 Flow Rate FiO2


 


2/14/20 04:26  86      


 


2/14/20 04:00 97.5 84 20 157/77 (103) 97   


 


2/14/20 01:21  62 20  99 Simple Mask 8.0 50





  60 20  96   


 


2/14/20 00:00  96      


 


2/14/20 00:00 97.2 66 17 119/49 (72) 97   


 


2/13/20 21:00      Simple Mask 8.0 


 


2/13/20 21:00  64  116/46    


 


2/13/20 20:00 97.5 64 18 116/44 (68) 96   


 


2/13/20 20:00  74      


 


2/13/20 19:40     97 Simple Mask 8.0 50


 


2/13/20 19:40  66 20  99 Simple Mask 8.0 50





  64 20  97   


 


2/13/20 17:33       8.0 


 


2/13/20 16:00 98.6  18 175/59 (97) 60   


 


2/13/20 16:00  89      


 


2/13/20 12:49  61 20  99 Simple Mask 8.0 50





  60 20  96   


 


2/13/20 12:04 96.6  19 105/47 (66) 64   


 


2/13/20 12:04       8.0 


 


2/13/20 12:00  79      


 


2/13/20 09:00      Simple Mask 8.0 

















Intake and Output  


 


 2/13/20 2/14/20





 19:00 07:00


 


Output Total 700 ml 1100 ml


 


Balance -700 ml -1100 ml


 


  


 


Output Urine Total 700 ml 1100 ml








Laboratory Tests


2/13/20 16:47: 


D-Dimer 2.44H, Ammonia 11


2/14/20 05:45: 


White Blood Count 11.9H, Red Blood Count 3.06L, Hemoglobin 9.5L, Hematocrit 

28.5L, Mean Corpuscular Volume 93, Mean Corpuscular Hemoglobin 31.1H, Mean 

Corpuscular Hemoglobin Concent 33.5, Red Cell Distribution Width 16.3H, 

Platelet Count 270, Mean Platelet Volume 5.7L, Neutrophils (%) (Auto) , 

Lymphocytes (%) (Auto) , Monocytes (%) (Auto) , Eosinophils (%) (Auto) , 

Basophils (%) (Auto) , Neutrophils % (Manual) [Pending], Lymphocytes % (Manual) 

[Pending], Platelet Estimate [Pending], Platelet Morphology [Pending], Sodium 

Level 142, Potassium Level 3.6, Chloride Level 108H, Carbon Dioxide Level 23, 

Anion Gap 11, Blood Urea Nitrogen 32H, Creatinine 1.5H, Estimat Glomerular 

Filtration Rate 44.1, Glucose Level 258H, Calcium Level 8.8


Height (Feet):  5


Height (Inches):  11.00


Weight (Pounds):  200


General Appearance:  no apparent distress, alert


EENT:  normal ENT inspection


Neck:  non-tender, supple


Cardiovascular:  normal rate, regular rhythm


Respiratory/Chest:  chest wall non-tender, lungs clear, normal breath sounds, 

no respiratory distress


Abdomen:  non tender, soft


Extremities:  normal range of motion, non-tender


Edema:  no edema noted Arm (L), no edema noted Arm (R), no edema noted Leg (L), 

no edema noted Leg (R), no edema noted Pedal (L), no edema noted Pedal (R), no 

edema noted Generalized


Neurologic:  alert, responsive


Skin:  warm/dry











Kandy Hardwick MD Feb 14, 2020 09:04

## 2020-02-14 NOTE — NUR
NURSE NOTES:WOUND CARE NOTES:Pt observed very restless in bed and is easily agitated.On 
entry at pt's bedside, observed pt pulling on F/C.Took much effort to release cath from pt's 
grasp. Small amt sanguineous exudate noted in catheter.  Pt did calm down to allow for staff 
to assess skin. Partial thickness pressure injury noted to sacrum (L)3cm x (W)2.5cm with 
surrounding non-blanching erythema without induration(L)9.5cm x (W)6.5cm. Both heels are 
soft but blanchable.

Moisture Barrier Paste applied to Sacrum and covered with Optifoam drsg. Additional moisture 
Barrier Paste applied to both ischial areas and scrotum to minimize friction on skin.Cavilon 
Skin Barrier applied to both hips and each hip covered with Optifoam drsgs. Cavilon Skin 
Barrier applied to both heels. Each heel covered with Optifoam drsgs. Pt positioned with 
pillow on his R side. Both heels floated off mattress with pillow.



Tx.plan: Apply Moisture Barrier Paste to Sacrum. Cover with Optifoam drsg. Change every 3 
days and prn. 

            Apply Cavilon Skin Barrier to R and L hips. Cover each hip with Optifoam drsg. 
Change every 7 days and prn.

            Apply Cavilon Skin Barrier to both heels. Cover each heel with Optifoam drsg. 
Change every 7 days and prn.

            Reposition at least every 2hours or as tolerated.

            Off-load heels with pillow.

## 2020-02-14 NOTE — DIAGNOSTIC IMAGING REPORT
EXAM:

  CT Head Without Intravenous Contrast

 

CLINICAL HISTORY:

  FALL

 

TECHNIQUE:

  Axial computed tomography images of the head/brain without intravenous 

contrast.  CTDI is 106 mGy and DLP is 2251 mGy-cm.  One or more of the 

following dose reduction techniques were used: automated exposure control,

 adjustment of the mA and/or kV according to patient size, use of 

iterative reconstruction technique.

 

COMPARISON:

  No relevant prior studies available.

 

FINDINGS:

  Limitations:  Limited due to motion.

  Brain:  No definite acute intracranial hemorrhage or cortical ischemia. 

 Chronic small vessel ischemic changes.

  Ventricles:  Unremarkable.

  Bones/joints:  Unremarkable.  No acute fracture.

  Soft tissues:  Unremarkable.

  Sinuses:  Unremarkable as visualized.

  Mastoid air cells:  Unremarkable as visualized.

 

IMPRESSION:     

1.  Limited due to motion.

2.  No definite acute intracranial hemorrhage or skull fracture.

## 2020-02-14 NOTE — NUR
NURSE NOTES:  Received patient from [facility].  Patient asleep in bed, but arousable.  
Uncooperative with care.  Fights with staff and attempts to pull maldonado catheter.  Will hold 
heparin for hematuria.  Restraints on, bed alarm on, bed locked, in low position, call light 
within reach.  NS infusing at 50ml/hr through left forearm 22 gauge piv.

## 2020-02-14 NOTE — NUR
NURSE NOTES:

pt prescribed ativan .025 for agitation q12hrs by Dr. Lord,.  Also he cancelled CT of 
head no contrast pt does not need it at this moment he is fully alert now.

## 2020-02-14 NOTE — CARDIAC ELECTROPHYSIOLOGY PN
Assessment/Plan


Assessment/Plan


1. Non-ST elevation myocardial infarction  type 2 in view of renal failure. Due 

to demand ischemia in view of


patient's sepsis, white count of 20,000.  The patient also has renal


failure with creatinine of 2.8.  Echo  showed Nl EF 55%. LE duplex no DVT


On aspirin, beta-blocker, and statin. Hx of CABG





2. Status post St You pacemaker.  Interrogated and showed Nl Fx. Battery more 

than a year left


3. Sepsis, COPD, pneumonia.  The patient is on ceftriaxone, azithromycin.


4. Advanced dementia.


5. Dysphagia,  Swallow eval pending. Family refused PEG





DW RN and son





Subjective


Subjective


Confused in restraints. Son at bedside.  NGT removed twice as was in esophagus.


 Awaiting swallow eval. A pacing on tele. Pacer interrogation showed Nl Fx





Objective





Last 24 Hour Vital Signs








  Date Time  Temp Pulse Resp B/P (MAP) Pulse Ox O2 Delivery O2 Flow Rate FiO2


 


2/14/20 07:45  104 18  98 Nasal Cannula 2.0 28





  83 18  97   


 


2/14/20 07:45     97 Nasal Cannula 2.0 28


 


2/14/20 04:26  86      


 


2/14/20 04:00 97.5 84 20 157/77 (103) 97   


 


2/14/20 01:21  62 20  99 Simple Mask 8.0 50





  60 20  96   


 


2/14/20 00:00  96      


 


2/14/20 00:00 97.2 66 17 119/49 (72) 97   


 


2/13/20 21:00      Simple Mask 8.0 


 


2/13/20 21:00  64  116/46    


 


2/13/20 20:00 97.5 64 18 116/44 (68) 96   


 


2/13/20 20:00  74      


 


2/13/20 19:40     97 Simple Mask 8.0 50


 


2/13/20 19:40  66 20  99 Simple Mask 8.0 50





  64 20  97   


 


2/13/20 17:33       8.0 


 


2/13/20 16:00 98.6  18 175/59 (97) 60   


 


2/13/20 16:00  89      


 


2/13/20 12:49  61 20  99 Simple Mask 8.0 50





  60 20  96   


 


2/13/20 12:04 96.6  19 105/47 (66) 64   


 


2/13/20 12:04       8.0 


 


2/13/20 12:00  79      

















Intake and Output  


 


 2/13/20 2/14/20





 19:00 07:00


 


Output Total 700 ml 1100 ml


 


Balance -700 ml -1100 ml


 


  


 


Output Urine Total 700 ml 1100 ml











Laboratory Tests








Test


  2/13/20


16:47 2/14/20


05:45


 


D-Dimer


  2.44 mg/L FEU


(0.00-0.49)  H 


 


 


Ammonia


  11 umol/L


(11-32) 


 


 


White Blood Count


  


  11.9 K/UL


(4.8-10.8)  H


 


Red Blood Count


  


  3.06 M/UL


(4.70-6.10)  L


 


Hemoglobin


  


  9.5 G/DL


(14.2-18.0)  L


 


Hematocrit


  


  28.5 %


(42.0-52.0)  L


 


Mean Corpuscular Volume  93 FL (80-99)  


 


Mean Corpuscular Hemoglobin


  


  31.1 PG


(27.0-31.0)  H


 


Mean Corpuscular Hemoglobin


Concent 


  33.5 G/DL


(32.0-36.0)


 


Red Cell Distribution Width


  


  16.3 %


(11.6-14.8)  H


 


Platelet Count


  


  270 K/UL


(150-450)


 


Mean Platelet Volume


  


  5.7 FL


(6.5-10.1)  L


 


Neutrophils (%) (Auto)


  


  % (45.0-75.0)


 


 


Lymphocytes (%) (Auto)


  


  % (20.0-45.0)


 


 


Monocytes (%) (Auto)   % (1.0-10.0)  


 


Eosinophils (%) (Auto)   % (0.0-3.0)  


 


Basophils (%) (Auto)   % (0.0-2.0)  


 


Differential Total Cells


Counted 


  100  


 


 


Neutrophils % (Manual)  91 % (45-75)  H


 


Lymphocytes % (Manual)  6 % (20-45)  L


 


Monocytes % (Manual)  3 % (1-10)  


 


Eosinophils % (Manual)  0 % (0-3)  


 


Basophils % (Manual)  0 % (0-2)  


 


Band Neutrophils  0 % (0-8)  


 


Platelet Estimate  Adequate  


 


Platelet Morphology  Normal  


 


Anisocytosis  1+  


 


Sodium Level


  


  142 MMOL/L


(136-145)


 


Potassium Level


  


  3.6 MMOL/L


(3.5-5.1)


 


Chloride Level


  


  108 MMOL/L


()  H


 


Carbon Dioxide Level


  


  23 MMOL/L


(21-32)


 


Anion Gap


  


  11 mmol/L


(5-15)


 


Blood Urea Nitrogen


  


  32 mg/dL


(7-18)  H


 


Creatinine


  


  1.5 MG/DL


(0.55-1.30)  H


 


Estimat Glomerular Filtration


Rate 


  44.1 mL/min


(>60)


 


Glucose Level


  


  258 MG/DL


()  H


 


Calcium Level


  


  8.8 MG/DL


(8.5-10.1)











Microbiology








 Date/Time


Source Procedure


Growth Status


 


 


 2/12/20 12:15


Blood Blood Culture - Preliminary


NO GROWTH AFTER 24 HOURS Resulted


 


 2/12/20 12:00


Blood Blood Culture - Preliminary


NO GROWTH AFTER 24 HOURS Resulted





 2/12/20 17:50


Sputum Expectorated Gram Stain - Final Resulted


 


 2/12/20 17:50 Sputum Culture - Preliminary


Pseudomonas Species Resulted





 2/12/20 04:24


Nasal Nares - Final Complete


 


 2/12/20 04:24


Nasal Nares - Final Complete


 


 2/12/20 04:00


Nasal Nares MRSA Culture - Final


NO METHICILLIN RESISTANT STAPH AUREUS... Complete


 


 2/12/20 04:00


Rectum - Final


NO CARBAPENEM-RESISTANT ENTEROBACTERI... Complete


 


 2/12/20 04:00


Rectum VRE Culture - Final


Enterococcus Faecalis - Vre Complete








Objective


HEAD AND NECK:  No JVD.


LUNGS:  Coarse rhonchi.


CARDIOVASCULAR:  Regular S1 and S2 with no gallop or murmur.


ABDOMEN:  Soft.


EXTREMITIES:  No pitting edema.











Bob Fuentes MD Feb 14, 2020 11:07

## 2020-02-14 NOTE — PULMONOLOGY PROGRESS NOTE
Assessment/Plan


Problems:  


(1) Basal pneumonia of both lungs


(2) Dysphasia


(3) Leukocytosis


(4) COPD (chronic obstructive pulmonary disease)


(5) JOAQUIN (acute kidney injury)


(6) Respiratory failure with hypoxia


(7) Elevated WBC count


(8) Failure to thrive


Assessment/Plan


ASSESSMENT:  The patient is an 89-year-old male with a history of dementia, 

chronic obstructive pulmonary disease, prior sternotomy, congestive heart 

failure, and sick sinus syndrome, status post pacemaker, presenting with a 

respiratory illness, likely healthcare-associated pneumonia with acute kidney 

injury and dehydration.





PROBLEM LIST:


1. Healthcare-associated pneumonia.


2. Acute hypoxemic respiratory failure secondary to above.


3. Acute kidney injury.


4. Dehydration.


5. Non ST-elevation myocardial infarction, likely demand ischemia.


6. Chronic obstructive pulmonary disease with acute exacerbation.


7. Hypertension.


8. Hypertensive heart disease.


9. Hyperlipidemia.


10. Diabetes type 2.


11. CAD, status post CABG and PCI.


12. History of Mobitz type II heart block and sick sinus syndrome,status post 

pacemaker.


13. Peripheral vascular disease.


14. History of angioedema in the past.


15. Dementia.





TREATMENT PLAN:


1. Optimize pulmonary hygiene/mobilize as tolerated.


2. Titrate down FiO2 to keep saturations greater than 90%.


3. Round-the-clock and p.r.n. bronchodilators.


4. Decreased Solu-Medrol to 20 mg IV b.i.d. and taper.


5. Antibiotics (Zosyn/Zyvox) per ID.


6. Monitor volumes and renal function, mIVF


7. Aspiration precautions, n.p.o., possible Dobhoff, NGT when able


8. DVT prophylaxis, heparin subcutaneous.


9. The patient is Full Code, continue to discuss goals of care.





Subjective


Allergies:  


Coded Allergies:  


     ACETAMINOPHEN (Verified  Allergy, Unknown, 2/12/20)


     HYDROCODONE (Verified  Allergy, Unknown, 2/12/20)


Subjective


AFVSS on 6L agitated


No cough no distress no wheezing no FC





Objective





Last 24 Hour Vital Signs








  Date Time  Temp Pulse Resp B/P (MAP) Pulse Ox O2 Delivery O2 Flow Rate FiO2


 


2/14/20 20:00 97.9 60 18 150/70 (96) 96   


 


2/14/20 19:31     95 Nasal Cannula 2.0 28


 


2/14/20 19:31  83 18  100 Nasal Cannula 2.0 28





  65 18  95   


 


2/14/20 16:00  72      


 


2/14/20 13:18  81 18  99 Nasal Cannula 2.0 28





  79 18  95   


 


2/14/20 12:00  93      


 


2/14/20 12:00 98.1 72 18 158/70 (99) 97   


 


2/14/20 11:30 98.2 70 19 131/68 (89) 94   


 


2/14/20 09:00      Nasal Cannula 2.0 


 


2/14/20 08:00  60      


 


2/14/20 07:45  104 18  98 Nasal Cannula 2.0 28





  83 18  97   


 


2/14/20 07:45     97 Nasal Cannula 2.0 28


 


2/14/20 04:26  86      


 


2/14/20 04:00 97.5 84 20 157/77 (103) 97   


 


2/14/20 01:21  62 20  99 Simple Mask 8.0 50





  60 20  96   


 


2/14/20 00:00  96      


 


2/14/20 00:00 97.2 66 17 119/49 (72) 97   

















Intake and Output  


 


 2/13/20 2/14/20





 19:00 07:00


 


Output Total 700 ml 1100 ml


 


Balance -700 ml -1100 ml


 


  


 


Output Urine Total 700 ml 1100 ml








General Appearance:  no acute distress, cachetic


HEENT:  normocephalic, atraumatic, anicteric, mucous membranes moist


Respiratory/Chest:  rhonchi


Cardiovascular:  normal peripheral pulses, normal rate, regular rhythm


Abdomen:  normal bowel sounds, soft, non tender, no organomegaly, non distended

, no mass


Extremities:  no cyanosis, no clubbing, no edema





Microbiology








 Date/Time


Source Procedure


Growth Status


 


 


 2/12/20 12:15


Blood Blood Culture - Preliminary


NO GROWTH AFTER 24 HOURS Resulted


 


 2/12/20 12:00


Blood Blood Culture - Preliminary


NO GROWTH AFTER 24 HOURS Resulted





 2/12/20 17:50


Sputum Expectorated Gram Stain - Final Resulted


 


 2/12/20 17:50 Sputum Culture - Preliminary


Pseudomonas Species Resulted





 2/12/20 04:24


Nasal Nares - Final Complete


 


 2/12/20 04:24


Nasal Nares - Final Complete


 


 2/12/20 04:00


Nasal Nares MRSA Culture - Final


NO METHICILLIN RESISTANT STAPH AUREUS... Complete


 


 2/12/20 04:00


Rectum - Final


NO CARBAPENEM-RESISTANT ENTEROBACTERI... Complete


 


 2/12/20 04:00


Rectum VRE Culture - Final


Enterococcus Faecalis - Vre Complete








Laboratory Tests


2/14/20 05:45: 


White Blood Count 11.9H, Red Blood Count 3.06L, Hemoglobin 9.5L, Hematocrit 

28.5L, Mean Corpuscular Volume 93, Mean Corpuscular Hemoglobin 31.1H, Mean 

Corpuscular Hemoglobin Concent 33.5, Red Cell Distribution Width 16.3H, 

Platelet Count 270, Mean Platelet Volume 5.7L, Neutrophils (%) (Auto) , 

Lymphocytes (%) (Auto) , Monocytes (%) (Auto) , Eosinophils (%) (Auto) , 

Basophils (%) (Auto) , Differential Total Cells Counted 100, Neutrophils % (

Manual) 91H, Lymphocytes % (Manual) 6L, Monocytes % (Manual) 3, Eosinophils % (

Manual) 0, Basophils % (Manual) 0, Band Neutrophils 0, Platelet Estimate 

Adequate, Platelet Morphology Normal, Anisocytosis 1+, Sodium Level 142, 

Potassium Level 3.6, Chloride Level 108H, Carbon Dioxide Level 23, Anion Gap 11

, Blood Urea Nitrogen 32H, Creatinine 1.5H, Estimat Glomerular Filtration Rate 

44.1, Glucose Level 258H, Calcium Level 8.8





Current Medications








 Medications


  (Trade)  Dose


 Ordered  Sig/Fito


 Route


 PRN Reason  Start Time


 Stop Time Status Last Admin


Dose Admin


 


 Albuterol/


 Ipratropium


  (Albuterol/


 Ipratropium)  3 ml  Q6HRT


 HHN


   2/12/20 13:00


 2/17/20 12:59  2/14/20 19:31


 


 


 Aspirin


  (ASA)  81 mg  DAILY


 ORAL


   2/13/20 09:00


 3/14/20 08:59   


 


 


 Atorvastatin


 Calcium


  (Lipitor)  20 mg  BEDTIME


 ORAL


   2/12/20 21:00


 3/13/20 20:59   


 


 


 Clopidogrel


 Bisulfate


  (Plavix)  75 mg  DAILY


 ORAL


   2/13/20 09:00


 3/14/20 08:59   


 


 


 Dextrose


  (Dextrose 50%)  25 ml  Q30M  PRN


 IV


 Hypoglycemia  2/12/20 08:45


 3/13/20 08:44   


 


 


 Dextrose


  (Dextrose 50%)  50 ml  Q30M  PRN


 IV


 Hypoglycemia  2/12/20 08:45


 3/13/20 08:44   


 


 


 Divalproex Sodium


  (Depakote


 Sprinkles)  125 mg  Q12HR


 ORAL


   2/14/20 11:00


 3/13/20 17:59   


 


 


 Docusate Sodium


  (Colace)  100 mg  THREE TIMES A  DAY


 ORAL


   2/12/20 13:00


 3/13/20 12:59   


 


 


 Donepezil HCl


  (Aricept)  10 mg  DAILY


 ORAL


   2/13/20 09:00


 3/14/20 08:59   


 


 


 Heparin Sodium


  (Porcine)


  (Heparin 5000


 units/ml)  5,000 units  EVERY 12  HOURS


 SUBQ


   2/12/20 09:00


 3/13/20 08:59  2/14/20 10:51


 


 


 Insulin Aspart


  (NovoLOG)    BEFORE MEALS AND  HS


 SUBQ


   2/12/20 11:30


 3/13/20 11:29  2/12/20 17:21


 


 


 Ipratropium


 Bromide


  (Atrovent)  500 mcg  Q6H  PRN


 HHN


 Shortness of Breath  2/12/20 10:00


 2/17/20 09:59   


 


 


 Lidocaine


  (Lidoderm 5%


 PATCH)  1 patch  DAILY


 TDERMAL


   2/13/20 09:00


 3/14/20 08:59  2/14/20 10:52


 


 


 Lorazepam


  (Ativan 2mg/ml


 1ml)  0.25 mg  Q12H  PRN


 IV


 Agitation  2/14/20 09:15


 2/21/20 09:14  2/14/20 19:12


 


 


 Methylprednisolone


 Sodium Succinate


  (Solu-MEDROL)  30 mg  EVERY 12  HOURS


 IVP


   2/13/20 21:00


 3/13/20 20:59  2/14/20 11:18


 


 


 Metoprolol


 Tartrate


  (Lopressor)  25 mg  EVERY 12  HOURS


 ORAL


   2/12/20 21:00


 3/13/20 20:59   


 


 


 Piperacillin Sod/


 Tazobactam Sod


 3.375 gm/Sodium


 Chloride  110 ml @ 


 27.5 mls/hr  Q8H


 IVPB


   2/12/20 16:00


 2/19/20 15:59  2/14/20 11:17


 


 


 Sodium Chloride  1,000 ml @ 


 50 mls/hr  Q20H


 IV


   2/12/20 12:00


 3/13/20 11:59  2/14/20 03:49


 


 


 Tamsulosin HCl


  (Flomax)  0.4 mg  DAILY


 ORAL


   2/13/20 09:00


 3/14/20 08:59   


 

















Mika Aguirre MD Feb 14, 2020 21:44

## 2020-02-14 NOTE — GI PROGRESS NOTE
Assessment/Plan


Problems:  


(1) Failure to thrive


SNOMED:  08877086


(2) Dysphasia


ICD Codes:  R47.02 - Dysphasia


SNOMED:  63775684


Status:  progressing, unchanged


Status Narrative


Discussed with Dr. Damon.


Assessment/Plan


History of pacemaker placement, currently on Plavix.


unsuccessful NGT placement


patient is more awake and alert today





ST evaluation to advance diet


will consider Dobbhoff if necessary.


anemia work up


OB stool r/o GI bleed


monitor H&H, prn transfusions


bowel regimen


ppi


fu labs


We will follow with additional recommendations on a daily basis





The patient was seen and examined at bedside and all new and available data was 

reviewed in the patients chart. I agree with the above findings, impression 

and plan.  (Patient seen earlier today. Signature stamp does not reflect 

patient encounter time.). - Jose Martin Damon MD





Subjective


Gastrointestinal/Abdominal:  Reports: no symptoms





Objective





Last 24 Hour Vital Signs








  Date Time  Temp Pulse Resp B/P (MAP) Pulse Ox O2 Delivery O2 Flow Rate FiO2


 


2/14/20 11:30 98.2 70 19 131/68 (89) 94   


 


2/14/20 09:00      Nasal Cannula 2.0 


 


2/14/20 07:45  104 18  98 Nasal Cannula 2.0 28





  83 18  97   


 


2/14/20 07:45     97 Nasal Cannula 2.0 28


 


2/14/20 04:26  86      


 


2/14/20 04:00 97.5 84 20 157/77 (103) 97   


 


2/14/20 01:21  62 20  99 Simple Mask 8.0 50





  60 20  96   


 


2/14/20 00:00  96      


 


2/14/20 00:00 97.2 66 17 119/49 (72) 97   


 


2/13/20 21:00      Simple Mask 8.0 


 


2/13/20 21:00  64  116/46    


 


2/13/20 20:00 97.5 64 18 116/44 (68) 96   


 


2/13/20 20:00  74      


 


2/13/20 19:40     97 Simple Mask 8.0 50


 


2/13/20 19:40  66 20  99 Simple Mask 8.0 50





  64 20  97   


 


2/13/20 17:33       8.0 


 


2/13/20 16:00 98.6  18 175/59 (97) 60   


 


2/13/20 16:00  89      


 


2/13/20 12:49  61 20  99 Simple Mask 8.0 50





  60 20  96   

















Intake and Output  


 


 2/13/20 2/14/20





 19:00 07:00


 


Output Total 700 ml 1100 ml


 


Balance -700 ml -1100 ml


 


  


 


Output Urine Total 700 ml 1100 ml











Laboratory Tests








Test


  2/13/20


16:47 2/14/20


05:45


 


D-Dimer


  2.44 mg/L FEU


(0.00-0.49)  H 


 


 


Ammonia


  11 umol/L


(11-32) 


 


 


White Blood Count


  


  11.9 K/UL


(4.8-10.8)  H


 


Red Blood Count


  


  3.06 M/UL


(4.70-6.10)  L


 


Hemoglobin


  


  9.5 G/DL


(14.2-18.0)  L


 


Hematocrit


  


  28.5 %


(42.0-52.0)  L


 


Mean Corpuscular Volume  93 FL (80-99)  


 


Mean Corpuscular Hemoglobin


  


  31.1 PG


(27.0-31.0)  H


 


Mean Corpuscular Hemoglobin


Concent 


  33.5 G/DL


(32.0-36.0)


 


Red Cell Distribution Width


  


  16.3 %


(11.6-14.8)  H


 


Platelet Count


  


  270 K/UL


(150-450)


 


Mean Platelet Volume


  


  5.7 FL


(6.5-10.1)  L


 


Neutrophils (%) (Auto)


  


  % (45.0-75.0)


 


 


Lymphocytes (%) (Auto)


  


  % (20.0-45.0)


 


 


Monocytes (%) (Auto)   % (1.0-10.0)  


 


Eosinophils (%) (Auto)   % (0.0-3.0)  


 


Basophils (%) (Auto)   % (0.0-2.0)  


 


Differential Total Cells


Counted 


  100  


 


 


Neutrophils % (Manual)  91 % (45-75)  H


 


Lymphocytes % (Manual)  6 % (20-45)  L


 


Monocytes % (Manual)  3 % (1-10)  


 


Eosinophils % (Manual)  0 % (0-3)  


 


Basophils % (Manual)  0 % (0-2)  


 


Band Neutrophils  0 % (0-8)  


 


Platelet Estimate  Adequate  


 


Platelet Morphology  Normal  


 


Anisocytosis  1+  


 


Sodium Level


  


  142 MMOL/L


(136-145)


 


Potassium Level


  


  3.6 MMOL/L


(3.5-5.1)


 


Chloride Level


  


  108 MMOL/L


()  H


 


Carbon Dioxide Level


  


  23 MMOL/L


(21-32)


 


Anion Gap


  


  11 mmol/L


(5-15)


 


Blood Urea Nitrogen


  


  32 mg/dL


(7-18)  H


 


Creatinine


  


  1.5 MG/DL


(0.55-1.30)  H


 


Estimat Glomerular Filtration


Rate 


  44.1 mL/min


(>60)


 


Glucose Level


  


  258 MG/DL


()  H


 


Calcium Level


  


  8.8 MG/DL


(8.5-10.1)








Height (Feet):  5


Height (Inches):  11.00


Weight (Pounds):  200


General Appearance:  no apparent distress, alert


Cardiovascular:  normal rate


Respiratory/Chest:  normal breath sounds, no respiratory distress


Abdominal Exam:  soft











Olivia Finnegan NP Feb 14, 2020 12:18

## 2020-02-14 NOTE — NUR
NURSE NOTES:

Pt awake and pulling on maldonado, even while being on restrain penis is red and urine is pink.  
Pt takes NC and has managed to pulled out IV out earlier in the day, night RN unable to put 
a new IV pt kicks and hits. Will try to put new IV line. pt on cardiac monitor no signs of 
cardiac or respiratory distress.   

-------------------------------------------------------------------------------

Addendum: 02/14/20 at 0825 by Sangita Jones RN

-------------------------------------------------------------------------------

NURSE NOTES:

Pt awake and pulling on maldonado, even while being on restrain penis is red and urine is pink.  
Pt takes NC and has managed to pulled out IV out earlier in the day, night RN unable to put 
a new IV pt kicks and hits. Will try to put new IV line. pt on cardiac monitor no signs of 
cardiac or respiratory distress.   pt bed in lowest position and locked.  Call light within 
reach.  Pt still need to have NG tube placement, pt has been NPO for a few days aldready. 
Will continue to follow plans of care and monitor pt.

## 2020-02-14 NOTE — DIAGNOSTIC IMAGING REPORT
Indication: Chest pain

 

Technique: A ventilation/perfusion scan was performed. Ventilation was performed

utilizing 40 mCi of Technetium 99m-DTPA. Perfusion was performed with 5.1 mCi of

technetium 99m-MAA injected intravenously. Multiple side by side projections

obtained.

 

Findings: 

 

Ventilation is mildly heterogeneous. No significant defects are identified. 

 

Perfusion is mildly heterogeneous. No significant defects are identified.  No

mismatched defects seen.

 

 

Impression: Low probability for pulmonary embolus

 

Interpretation of findings are based on PINICOLAD II robson (2006).

## 2020-02-14 NOTE — DIAGNOSTIC IMAGING REPORT
EXAM:

  CT Lumbar Spine Without Intravenous Contrast

 

CLINICAL HISTORY:

  FALL

 

TECHNIQUE:

  Axial computed tomography images of the lumbar spine without 

intravenous contrast.  CTDI is 10 mGy and DLP is 593 mGy-cm.  One or more 

of the following dose reduction techniques were used: automated exposure 

control, adjustment of the mA and/or kV according to patient size, use of 

iterative reconstruction technique.

 

COMPARISON:

  No relevant prior studies available.

 

FINDINGS:

  Vertebrae:  No acute fracture or subluxation.

  Discs/spinal canal/neural foramina:  Multilevel degenerative changes.

  Soft tissues:  Anasarca.

 

IMPRESSION:     

  No acute fracture or subluxation.

## 2020-02-14 NOTE — NUR
NURSE NOTES:

1645 pt was found on the floor by Yanci/STEFANY, bed alarm was audible at time of fall, 
preceded to take vitals signs 142/65 HR 65 R18 O294 patient was moving body not complaining 
of any pain when assessed.   Neuro assessment completed as well, no bumps or bruising was 
detected.  Assisted pt to bed by additional hospital personnel.  Notified family member 
Wally/son and Dr. Hardwick about situation.  Doctor ordered CT of head, pelvis and Lumbar 
spine.

## 2020-02-14 NOTE — NUR
RD ASSESSMENT & RECOMMENDATIONS

SEE CARE ACTIVITY FOR COMPLETE ASSESSMENT



DAILY ESTIMATED NEEDS:

Needs based on DM, pulmonary, wound/ 72.7kg 

25-30  kcals/kg 

6553-0396  total kcals

1.25-1.5  g protein/kg

  g total protein 

25-30  mL/kg

3420-4942  total fluid mLs



NUTRITION DIAGNOSIS:

Swallowing difficulty R/T dysphagia as evidenced by pt on pureed w/ NTL

diet PTA, initially w/ an order for NGT insertion, pt now more alert, w/

an order for pureed moist, NTL per SLP.





CURRENT DIET:CCHO MED, PUREED MOIST W/ NTL    



 



PO DIET RECOMMENDATIONS:

CCHO MED, LOW NA/ texture per SLP + Glucerna TID w/ meals





ENTERAL NUTRITION RECOMMENDATIONS:

CONSULT RD IF TF INDICATED  



 



ADDITIONAL RECOMMENDATIONS:

* Maintain calibrated bedscale wt (SNF he=418# on 2/5, bedscale wt=160#) 

* Add Glucerna TID w/ meals (220kcal/10g prot per josé luis) 

* Monitor BGs closely w/ Solumedrol, need for long acting insulin  

            (BGs mostly in the 200's) 

* F/up w/ wound care eval 

     -> add MVI x 1, Vit C 250mg QD + Maikel 1pkt BID   

* Monitor PO intake, need to liberalize diet for improved PO acceptance

## 2020-02-14 NOTE — NUR
*-*DISCHARGE PLANNING*-*



PATIENT HAS BEEN REFERRED TO:



[address]

 P: [phone]

 F: [phone]

## 2020-02-14 NOTE — NUR
HAND-OFF: 

Report given to Yosi/TOAMSZ,  endorsed family will like to be called for all results post fall.

## 2020-02-15 VITALS — DIASTOLIC BLOOD PRESSURE: 63 MMHG | SYSTOLIC BLOOD PRESSURE: 143 MMHG

## 2020-02-15 VITALS — SYSTOLIC BLOOD PRESSURE: 142 MMHG | DIASTOLIC BLOOD PRESSURE: 61 MMHG

## 2020-02-15 VITALS — SYSTOLIC BLOOD PRESSURE: 111 MMHG | DIASTOLIC BLOOD PRESSURE: 64 MMHG

## 2020-02-15 VITALS — DIASTOLIC BLOOD PRESSURE: 66 MMHG | SYSTOLIC BLOOD PRESSURE: 139 MMHG

## 2020-02-15 VITALS — SYSTOLIC BLOOD PRESSURE: 147 MMHG | DIASTOLIC BLOOD PRESSURE: 62 MMHG

## 2020-02-15 VITALS — SYSTOLIC BLOOD PRESSURE: 154 MMHG | DIASTOLIC BLOOD PRESSURE: 63 MMHG

## 2020-02-15 LAB
ADD MANUAL DIFF: YES
ANION GAP SERPL CALC-SCNC: 8 MMOL/L (ref 5–15)
BUN SERPL-MCNC: 28 MG/DL (ref 7–18)
CALCIUM SERPL-MCNC: 8.8 MG/DL (ref 8.5–10.1)
CHLORIDE SERPL-SCNC: 111 MMOL/L (ref 98–107)
CO2 SERPL-SCNC: 27 MMOL/L (ref 21–32)
CREAT SERPL-MCNC: 1.3 MG/DL (ref 0.55–1.3)
ERYTHROCYTE [DISTWIDTH] IN BLOOD BY AUTOMATED COUNT: 16.1 % (ref 11.6–14.8)
HCT VFR BLD CALC: 28.6 % (ref 42–52)
HGB BLD-MCNC: 9.6 G/DL (ref 14.2–18)
MCV RBC AUTO: 93 FL (ref 80–99)
PLATELET # BLD: 248 K/UL (ref 150–450)
POTASSIUM SERPL-SCNC: 3.7 MMOL/L (ref 3.5–5.1)
RBC # BLD AUTO: 3.07 M/UL (ref 4.7–6.1)
SODIUM SERPL-SCNC: 146 MMOL/L (ref 136–145)
WBC # BLD AUTO: 10.2 K/UL (ref 4.8–10.8)

## 2020-02-15 RX ADMIN — TAMSULOSIN HYDROCHLORIDE SCH MG: 0.4 CAPSULE ORAL at 09:38

## 2020-02-15 RX ADMIN — ASPIRIN 81 MG SCH MG: 81 TABLET ORAL at 09:38

## 2020-02-15 RX ADMIN — LORAZEPAM PRN MG: 2 INJECTION, SOLUTION INTRAMUSCULAR; INTRAVENOUS at 16:00

## 2020-02-15 RX ADMIN — METHYLPREDNISOLONE SODIUM SUCCINATE SCH MG: 40 INJECTION, POWDER, LYOPHILIZED, FOR SOLUTION INTRAMUSCULAR; INTRAVENOUS at 11:41

## 2020-02-15 RX ADMIN — INSULIN ASPART SCH UNITS: 100 INJECTION, SOLUTION INTRAVENOUS; SUBCUTANEOUS at 06:05

## 2020-02-15 RX ADMIN — IPRATROPIUM BROMIDE AND ALBUTEROL SULFATE SCH ML: .5; 3 SOLUTION RESPIRATORY (INHALATION) at 01:20

## 2020-02-15 RX ADMIN — DOCUSATE SODIUM SCH MG: 100 CAPSULE, LIQUID FILLED ORAL at 13:00

## 2020-02-15 RX ADMIN — INSULIN ASPART SCH UNITS: 100 INJECTION, SOLUTION INTRAVENOUS; SUBCUTANEOUS at 16:30

## 2020-02-15 RX ADMIN — SODIUM CHLORIDE SCH MLS/HR: 0.9 INJECTION INTRAVENOUS at 11:41

## 2020-02-15 RX ADMIN — DEXTROSE MONOHYDRATE SCH MLS/HR: 50 INJECTION, SOLUTION INTRAVENOUS at 08:37

## 2020-02-15 RX ADMIN — HEPARIN SODIUM SCH UNITS: 5000 INJECTION INTRAVENOUS; SUBCUTANEOUS at 21:43

## 2020-02-15 RX ADMIN — SODIUM CHLORIDE SCH MLS/HR: 0.9 INJECTION INTRAVENOUS at 21:44

## 2020-02-15 RX ADMIN — DOCUSATE SODIUM SCH MG: 100 CAPSULE, LIQUID FILLED ORAL at 09:38

## 2020-02-15 RX ADMIN — INSULIN ASPART SCH UNITS: 100 INJECTION, SOLUTION INTRAVENOUS; SUBCUTANEOUS at 21:43

## 2020-02-15 RX ADMIN — DIVALPROEX SODIUM SCH MG: 125 CAPSULE ORAL at 21:44

## 2020-02-15 RX ADMIN — DONEPEZIL HYDROCHLORIDE SCH MG: 10 TABLET, FILM COATED ORAL at 09:38

## 2020-02-15 RX ADMIN — INSULIN ASPART SCH UNITS: 100 INJECTION, SOLUTION INTRAVENOUS; SUBCUTANEOUS at 11:30

## 2020-02-15 RX ADMIN — HEPARIN SODIUM SCH UNITS: 5000 INJECTION INTRAVENOUS; SUBCUTANEOUS at 09:40

## 2020-02-15 RX ADMIN — IPRATROPIUM BROMIDE AND ALBUTEROL SULFATE SCH ML: .5; 3 SOLUTION RESPIRATORY (INHALATION) at 13:22

## 2020-02-15 RX ADMIN — DOCUSATE SODIUM SCH MG: 100 CAPSULE, LIQUID FILLED ORAL at 18:22

## 2020-02-15 RX ADMIN — METHYLPREDNISOLONE SODIUM SUCCINATE SCH MG: 40 INJECTION, POWDER, LYOPHILIZED, FOR SOLUTION INTRAMUSCULAR; INTRAVENOUS at 23:09

## 2020-02-15 RX ADMIN — IPRATROPIUM BROMIDE AND ALBUTEROL SULFATE SCH ML: .5; 3 SOLUTION RESPIRATORY (INHALATION) at 07:08

## 2020-02-15 RX ADMIN — ATORVASTATIN CALCIUM SCH MG: 20 TABLET, FILM COATED ORAL at 21:44

## 2020-02-15 RX ADMIN — IPRATROPIUM BROMIDE AND ALBUTEROL SULFATE SCH ML: .5; 3 SOLUTION RESPIRATORY (INHALATION) at 19:09

## 2020-02-15 RX ADMIN — DIVALPROEX SODIUM SCH MG: 125 CAPSULE ORAL at 09:38

## 2020-02-15 NOTE — GENERAL PROGRESS NOTE
Assessment/Plan


Status:  progressing, unchanged


Assessment/Plan:


Assessment


- AMS


- anorexia


- Anemia


- Resolved azotemia


- FTT





Recommendations


- po diet as tolerated


- follow labs  and exam


- psych f/u


- May need TF





Subjective


Allergies:  


Coded Allergies:  


     ACETAMINOPHEN (Verified  Allergy, Unknown, 2/12/20)


     HYDROCODONE (Verified  Allergy, Unknown, 2/12/20)


Subjective


Confused 


pulling on restraints 


poor po per RN





Objective





Last 24 Hour Vital Signs








  Date Time  Temp Pulse Resp B/P (MAP) Pulse Ox O2 Delivery O2 Flow Rate FiO2


 


2/15/20 13:23  80 24  100 Nasal Cannula 2.0 28





  74 20  95   


 


2/15/20 12:00 97.2 60 20 142/61 (88) 97   


 


2/15/20 11:46  84      


 


2/15/20 09:37  95  111/64    


 


2/15/20 09:00      Nasal Cannula 2.0 


 


2/15/20 08:00 97.9 95 20 111/64 (80) 95   


 


2/15/20 07:52  61      


 


2/15/20 07:09     94 Nasal Cannula 2.0 28


 


2/15/20 07:08  79 22  100 Nasal Cannula 2.0 28





  76 18  96   


 


2/15/20 04:00  61      


 


2/15/20 04:00 97.7 62 20 147/62 (90) 95   


 


2/15/20 01:20  78 17  100 Nasal Cannula 2.0 28





  60 18  96   


 


2/15/20 00:00  60      


 


2/15/20 00:00 97.8 60 19 154/63 (93) 96   


 


2/14/20 21:57  60  150/70    


 


2/14/20 21:00      Nasal Cannula 2.0 


 


2/14/20 20:00  78      


 


2/14/20 20:00 97.9 60 18 150/70 (96) 96   


 


2/14/20 19:31     95 Nasal Cannula 2.0 28


 


2/14/20 19:31  83 18  100 Nasal Cannula 2.0 28





  65 18  95   


 


2/14/20 16:00  72      

















Intake and Output  


 


 2/14/20 2/15/20





 19:00 07:00


 


Output Total 680 ml 600 ml


 


Balance -680 ml -600 ml


 


  


 


Output Urine Total 680 ml 600 ml


 


# Bowel Movements  1








Laboratory Tests


2/15/20 06:15: 


White Blood Count 10.2, Red Blood Count 3.07L, Hemoglobin 9.6L, Hematocrit 28.6L

, Mean Corpuscular Volume 93, Mean Corpuscular Hemoglobin 31.2H, Mean 

Corpuscular Hemoglobin Concent 33.5, Red Cell Distribution Width 16.1H, 

Platelet Count 248, Mean Platelet Volume 5.3L, Neutrophils (%) (Auto) , 

Lymphocytes (%) (Auto) , Monocytes (%) (Auto) , Eosinophils (%) (Auto) , 

Basophils (%) (Auto) , Differential Total Cells Counted 100, Neutrophils % (

Manual) 94H, Lymphocytes % (Manual) 3L, Monocytes % (Manual) 3, Eosinophils % (

Manual) 0, Basophils % (Manual) 0, Band Neutrophils 0, Platelet Estimate 

Adequate, Platelet Morphology Normal, Hypochromasia 2+, Anisocytosis 1+, 

Spherocytes 1+, Sodium Level 146H, Potassium Level 3.7, Chloride Level 111H, 

Carbon Dioxide Level 27, Anion Gap 8, Blood Urea Nitrogen 28H, Creatinine 1.3, 

Estimat Glomerular Filtration Rate 52.0, Glucose Level 165H, Calcium Level 8.8


Height (Feet):  5


Height (Inches):  11.00


Weight (Pounds):  200


Objective


confused 


incoherent 


restrained


CTA


RR 


abd soft


no edema











Delia Cardenas MD Feb 15, 2020 14:29

## 2020-02-15 NOTE — NUR
NURSE NOTES:



Notified PMD regarding elevated sodium level at 146 this AM. Per MD, continue IVF of NS at 
50cc/hr until urine color becomes clear. Will carry out order.

## 2020-02-15 NOTE — NUR
NURSE NOTES:



Dr. Cardenas at bedside. Notified MD regarding patient refusal to eat. Will continue to 
monitor.

## 2020-02-15 NOTE — NUR
NURSE NOTES:



Held insulin dose at this time. Patient has poor appetite and refusing to eat. Will reassess 
at lunchtime.

## 2020-02-15 NOTE — PULMONOLOGY PROGRESS NOTE
Assessment/Plan


Problems:  


(1) Basal pneumonia of both lungs


(2) Dysphasia


(3) Leukocytosis


(4) COPD (chronic obstructive pulmonary disease)


(5) JOAQUIN (acute kidney injury)


(6) Respiratory failure with hypoxia


(7) Elevated WBC count


(8) Failure to thrive


Assessment/Plan


ASSESSMENT:  The patient is an 89-year-old male with a history of dementia, 

chronic obstructive pulmonary disease, prior sternotomy, congestive heart 

failure, and sick sinus syndrome, status post pacemaker, presenting with a 

respiratory illness, likely healthcare-associated pneumonia with acute kidney 

injury and dehydration.





PROBLEM LIST:


1. Healthcare-associated pneumonia.


2. Acute hypoxemic respiratory failure secondary to above.


3. Acute kidney injury.


4. Dehydration.


5. Non ST-elevation myocardial infarction, likely demand ischemia.


6. Chronic obstructive pulmonary disease with acute exacerbation.


7. Hypertension.


8. Hypertensive heart disease.


9. Hyperlipidemia.


10. Diabetes type 2.


11. CAD, status post CABG and PCI.


12. History of Mobitz type II heart block and sick sinus syndrome,status post 

pacemaker.


13. Peripheral vascular disease.


14. History of angioedema in the past.


15. Dementia.





TREATMENT PLAN:


1. Optimize pulmonary hygiene/mobilize as tolerated.


2. Titrate down FiO2 to keep saturations greater than 90%.


3. Round-the-clock and p.r.n. bronchodilators.


4. Decreased Solu-Medrol to 10 mg IV b.i.d. and taper.


5. Antibiotics per ID.


6. Monitor volumes and renal function, mIVF


7. Aspiration precautions, PO as able


8. DVT prophylaxis, heparin subcutaneous.


9. The patient is Full Code, continue to discuss goals of care.





Subjective


Allergies:  


Coded Allergies:  


     ACETAMINOPHEN (Verified  Allergy, Unknown, 2/12/20)


     HYDROCODONE (Verified  Allergy, Unknown, 2/12/20)


Subjective


AFVSS now on 2L


Passed SLP but refusing to eat


Agitated


D-dimer elevated but VQ neg


No cough no distress no wheezing no FC





Objective





Last 24 Hour Vital Signs








  Date Time  Temp Pulse Resp B/P (MAP) Pulse Ox O2 Delivery O2 Flow Rate FiO2


 


2/15/20 16:12 97.0 76 20 143/63 (89) 97   


 


2/15/20 13:23  80 24  100 Nasal Cannula 2.0 28





  74 20  95   


 


2/15/20 12:00 97.2 60 20 142/61 (88) 97   


 


2/15/20 11:46  84      


 


2/15/20 09:37  95  111/64    


 


2/15/20 09:00      Nasal Cannula 2.0 


 


2/15/20 08:00 97.9 95 20 111/64 (80) 95   


 


2/15/20 07:52  61      


 


2/15/20 07:09     94 Nasal Cannula 2.0 28


 


2/15/20 07:08  79 22  100 Nasal Cannula 2.0 28





  76 18  96   


 


2/15/20 04:00  61      


 


2/15/20 04:00 97.7 62 20 147/62 (90) 95   


 


2/15/20 01:20  78 17  100 Nasal Cannula 2.0 28





  60 18  96   


 


2/15/20 00:00  60      


 


2/15/20 00:00 97.8 60 19 154/63 (93) 96   


 


2/14/20 21:57  60  150/70    


 


2/14/20 21:00      Nasal Cannula 2.0 


 


2/14/20 20:00  78      


 


2/14/20 20:00 97.9 60 18 150/70 (96) 96   


 


2/14/20 19:31     95 Nasal Cannula 2.0 28


 


2/14/20 19:31  83 18  100 Nasal Cannula 2.0 28





  65 18  95   

















Intake and Output  


 


 2/14/20 2/15/20





 19:00 07:00


 


Output Total 680 ml 600 ml


 


Balance -680 ml -600 ml


 


  


 


Output Urine Total 680 ml 600 ml


 


# Bowel Movements  1








General Appearance:  cachetic, other - agitated


Respiratory/Chest:  chest wall non-tender, rhonchi


Cardiovascular:  normal peripheral pulses, normal rate, regular rhythm


Abdomen:  normal bowel sounds, soft, non tender, no organomegaly, non distended

, no mass


Extremities:  no cyanosis, no clubbing


Skin:  rash





Microbiology








 Date/Time


Source Procedure


Growth Status


 


 


 2/12/20 17:50


Sputum Expectorated Gram Stain - Final Complete


 


 2/12/20 17:50 Sputum Culture - Final


Pseudomonas Aeruginosa Complete








Laboratory Tests


2/15/20 06:15: 


White Blood Count 10.2, Red Blood Count 3.07L, Hemoglobin 9.6L, Hematocrit 28.6L

, Mean Corpuscular Volume 93, Mean Corpuscular Hemoglobin 31.2H, Mean 

Corpuscular Hemoglobin Concent 33.5, Red Cell Distribution Width 16.1H, 

Platelet Count 248, Mean Platelet Volume 5.3L, Neutrophils (%) (Auto) , 

Lymphocytes (%) (Auto) , Monocytes (%) (Auto) , Eosinophils (%) (Auto) , 

Basophils (%) (Auto) , Differential Total Cells Counted 100, Neutrophils % (

Manual) 94H, Lymphocytes % (Manual) 3L, Monocytes % (Manual) 3, Eosinophils % (

Manual) 0, Basophils % (Manual) 0, Band Neutrophils 0, Platelet Estimate 

Adequate, Platelet Morphology Normal, Hypochromasia 2+, Anisocytosis 1+, 

Spherocytes 1+, Sodium Level 146H, Potassium Level 3.7, Chloride Level 111H, 

Carbon Dioxide Level 27, Anion Gap 8, Blood Urea Nitrogen 28H, Creatinine 1.3, 

Estimat Glomerular Filtration Rate 52.0, Glucose Level 165H, Calcium Level 8.8





Current Medications








 Medications


  (Trade)  Dose


 Ordered  Sig/Fito


 Route


 PRN Reason  Start Time


 Stop Time Status Last Admin


Dose Admin


 


 Albuterol/


 Ipratropium


  (Albuterol/


 Ipratropium)  3 ml  Q6HRT


 HHN


   2/12/20 13:00


 2/17/20 12:59  2/15/20 13:22


 


 


 Aspirin


  (ASA)  81 mg  DAILY


 ORAL


   2/13/20 09:00


 3/14/20 08:59  2/15/20 09:38


 


 


 Atorvastatin


 Calcium


  (Lipitor)  20 mg  BEDTIME


 ORAL


   2/12/20 21:00


 3/13/20 20:59  2/14/20 21:57


 


 


 Cefepime HCl 2 gm/


 Dextrose  110 ml @ 


 220 mls/hr  Q8HR


 IV


   2/15/20 11:30


 2/22/20 11:29  2/15/20 11:41


 


 


 Clopidogrel


 Bisulfate


  (Plavix)  75 mg  DAILY


 ORAL


   2/13/20 09:00


 3/14/20 08:59  2/15/20 09:37


 


 


 Dextrose


  (Dextrose 50%)  25 ml  Q30M  PRN


 IV


 Hypoglycemia  2/12/20 08:45


 3/13/20 08:44   


 


 


 Dextrose


  (Dextrose 50%)  50 ml  Q30M  PRN


 IV


 Hypoglycemia  2/12/20 08:45


 3/13/20 08:44   


 


 


 Divalproex Sodium


  (Depakote


 Sprinkles)  125 mg  Q12HR


 ORAL


   2/14/20 11:00


 3/13/20 17:59  2/15/20 09:38


 


 


 Docusate Sodium


  (Colace)  100 mg  THREE TIMES A  DAY


 ORAL


   2/12/20 13:00


 3/13/20 12:59  2/15/20 09:38


 


 


 Donepezil HCl


  (Aricept)  10 mg  DAILY


 ORAL


   2/13/20 09:00


 3/14/20 08:59  2/15/20 09:38


 


 


 Heparin Sodium


  (Porcine)


  (Heparin 5000


 units/ml)  5,000 units  EVERY 12  HOURS


 SUBQ


   2/12/20 09:00


 3/13/20 08:59  2/15/20 09:40


 


 


 Insulin Aspart


  (NovoLOG)    BEFORE MEALS AND  HS


 SUBQ


   2/12/20 11:30


 3/13/20 11:29  2/15/20 16:30


 


 


 Ipratropium


 Bromide


  (Atrovent)  500 mcg  Q6H  PRN


 HHN


 Shortness of Breath  2/12/20 10:00


 2/17/20 09:59   


 


 


 Lidocaine


  (Lidoderm 5%


 PATCH)  1 patch  DAILY


 TDERMAL


   2/13/20 09:00


 3/14/20 08:59  2/15/20 09:39


 


 


 Lorazepam


  (Ativan 2mg/ml


 1ml)  0.25 mg  Q12H  PRN


 IV


 Agitation  2/14/20 09:15


 2/21/20 09:14  2/15/20 16:00


 


 


 Methylprednisolone


 Sodium Succinate


  (Solu-MEDROL)  20 mg  Q12H


 IVP


   2/14/20 23:00


 3/15/20 22:59  2/15/20 11:41


 


 


 Metoprolol


 Tartrate


  (Lopressor)  25 mg  EVERY 12  HOURS


 ORAL


   2/12/20 21:00


 3/13/20 20:59  2/15/20 09:37


 


 


 Sodium Chloride  1,000 ml @ 


 50 mls/hr  Q20H


 IV


   2/12/20 12:00


 3/13/20 11:59  2/14/20 03:49


 


 


 Tamsulosin HCl


  (Flomax)  0.4 mg  DAILY


 ORAL


   2/13/20 09:00


 3/14/20 08:59  2/15/20 09:38


 

















Mika Aguirre MD Feb 15, 2020 16:33

## 2020-02-15 NOTE — NUR
NURSE NOTES:

Received patient from TOMASZ Espinosa. Patient is awake laying in bed restless and attempting to 
get out of bed. There were no signs of pain. He is AO x 1. He is not able to ambulate. 
Checked IV site, patent and flushed.There were no signs of erythema, bleeding, or 
infiltration. Bed in the lowest position with brakes on and side rails x 3. CAll light 
within reach. Will continue plan of care.

## 2020-02-15 NOTE — GENERAL PROGRESS NOTE
Assessment/Plan


Status:  progressing, unchanged


Assessment/Plan:


1. Pneumonia - cont IV Abx 


2. Leukocytosis improving, possible sepsis - improved. cont IV abx.


3. Elevated Troponin 2nd to NSTEMI due to demand Ischemia most likely due to 

KELVIN and sepsis - cardiology following.


4. COPD exacerbation - on solumedrol and pulmonary following.


5. Hypotension 2nd to sepsis and pneumonia - improved.


6. Dementia - on oral feeding and oral meds now.


7. Acute Hypoxemic respiratory Failure 2nd to pneumonia and sepsis


8. JOAQUIN - improving with hydration.


9. Hypertensive heart disease - on metoprolol 25 mg po bid.


10. HLD - cont oral meds.


11. DM II - start SSI.


12. CAD s/p CABG and stent placement.


13. H/O PVD


14. H/O angioedema in the past.





Subjective


Date patient seen:  Feb 15, 2020


Time patient seen:  10:00


Constitutional:  Reports: no symptoms


HEENT:  Reports: no symptoms


Cardiovascular:  Reports: no symptoms


Respiratory:  Reports: no symptoms


Gastrointestinal/Abdominal:  Reports: no symptoms


Genitourinary:  Reports: hematuria


Neurologic/Psychiatric:  Reports: weakness


Endocrine:  Reports: no symptoms


Allergies:  


Coded Allergies:  


     ACETAMINOPHEN (Verified  Allergy, Unknown, 2/12/20)


     HYDROCODONE (Verified  Allergy, Unknown, 2/12/20)


Subjective


This morning he is awake and he has hematuria. His maldonado was in urethra per CT 

scan reading.


Afebrile and His WBC improving.





Objective





Last 24 Hour Vital Signs








  Date Time  Temp Pulse Resp B/P (MAP) Pulse Ox O2 Delivery O2 Flow Rate FiO2


 


2/15/20 09:37  95  111/64    


 


2/15/20 07:09     94 Nasal Cannula 2.0 28


 


2/15/20 07:08  79 22  100 Nasal Cannula 2.0 28





  76 18  96   


 


2/15/20 04:00  61      


 


2/15/20 04:00 97.7 62 20 147/62 (90) 95   


 


2/15/20 01:20  78 17  100 Nasal Cannula 2.0 28





  60 18  96   


 


2/15/20 00:00  60      


 


2/15/20 00:00 97.8 60 19 154/63 (93) 96   


 


2/14/20 21:57  60  150/70    


 


2/14/20 21:00      Nasal Cannula 2.0 


 


2/14/20 20:00  78      


 


2/14/20 20:00 97.9 60 18 150/70 (96) 96   


 


2/14/20 19:31     95 Nasal Cannula 2.0 28


 


2/14/20 19:31  83 18  100 Nasal Cannula 2.0 28





  65 18  95   


 


2/14/20 16:00  72      


 


2/14/20 13:18  81 18  99 Nasal Cannula 2.0 28





  79 18  95   


 


2/14/20 12:00  93      


 


2/14/20 12:00 98.1 72 18 158/70 (99) 97   


 


2/14/20 11:30 98.2 70 19 131/68 (89) 94   

















Intake and Output  


 


 2/14/20 2/15/20





 19:00 07:00


 


Output Total 680 ml 600 ml


 


Balance -680 ml -600 ml


 


  


 


Output Urine Total 680 ml 600 ml


 


# Bowel Movements  1








Laboratory Tests


2/15/20 06:15: 


White Blood Count 10.2, Red Blood Count 3.07L, Hemoglobin 9.6L, Hematocrit 28.6L

, Mean Corpuscular Volume 93, Mean Corpuscular Hemoglobin 31.2H, Mean 

Corpuscular Hemoglobin Concent 33.5, Red Cell Distribution Width 16.1H, 

Platelet Count 248, Mean Platelet Volume 5.3L, Neutrophils (%) (Auto) , 

Lymphocytes (%) (Auto) , Monocytes (%) (Auto) , Eosinophils (%) (Auto) , 

Basophils (%) (Auto) , Neutrophils % (Manual) [Pending], Lymphocytes % (Manual) 

[Pending], Platelet Estimate [Pending], Platelet Morphology [Pending], Sodium 

Level 146H, Potassium Level 3.7, Chloride Level 111H, Carbon Dioxide Level 27, 

Anion Gap 8, Blood Urea Nitrogen 28H, Creatinine 1.3, Estimat Glomerular 

Filtration Rate 52.0, Glucose Level 165H, Calcium Level 8.8


Height (Feet):  5


Height (Inches):  11.00


Weight (Pounds):  200


General Appearance:  no apparent distress, alert


Neck:  non-tender, normal alignment


Cardiovascular:  normal peripheral pulses, normal rate


Respiratory/Chest:  lungs clear, normal breath sounds


Abdomen:  normal bowel sounds, non tender, soft


Extremities:  normal range of motion, non-tender


Edema:  no edema noted Arm (L), no edema noted Arm (R), no edema noted Leg (L), 

no edema noted Leg (R), no edema noted Pedal (L), no edema noted Pedal (R), no 

edema noted Generalized


Neurologic:  alert, responsive


Skin:  warm/dry











Kandy Hardwick MD Feb 15, 2020 10:16

## 2020-02-15 NOTE — CARDIAC ELECTROPHYSIOLOGY PN
Assessment/Plan


Assessment/Plan


1. Non-ST elevation myocardial infarction  type 2 in view of renal failure. Due 

to demand ischemia in view of


patient's sepsis, white count of 20,000.  The patient also has renal


failure with creatinine of 2.8.  Echo  showed Nl EF 55%. LE duplex no DVT


On aspirin, Lopressor 25 bid, and Lipitor 40. Hx of CABG. No need for further 

troponin





2. Status post St You pacemaker.  Interrogated and showed Nl Fx. 


     Battery more than a year left


3. Sepsis, COPD, pneumonia.  On Zosyn per ID


4. Advanced dementia.


5. Dysphagia,  passed Swallow eval but refusing eating





DW RN





Subjective


Subjective


Family took off the restraints and he fell. Back on restraints.  .


Passed swallow eval. A pacing on tele. Pacer interrogation showed Nl Fx





Objective





Last 24 Hour Vital Signs








  Date Time  Temp Pulse Resp B/P (MAP) Pulse Ox O2 Delivery O2 Flow Rate FiO2


 


2/15/20 07:09     94 Nasal Cannula 2.0 28


 


2/15/20 07:08  79 22  100 Nasal Cannula 2.0 28





  76 18  96   


 


2/15/20 04:00  61      


 


2/15/20 04:00 97.7 62 20 147/62 (90) 95   


 


2/15/20 01:20  78 17  100 Nasal Cannula 2.0 28





  60 18  96   


 


2/15/20 00:00  60      


 


2/15/20 00:00 97.8 60 19 154/63 (93) 96   


 


2/14/20 21:57  60  150/70    


 


2/14/20 21:00      Nasal Cannula 2.0 


 


2/14/20 20:00  78      


 


2/14/20 20:00 97.9 60 18 150/70 (96) 96   


 


2/14/20 19:31     95 Nasal Cannula 2.0 28


 


2/14/20 19:31  83 18  100 Nasal Cannula 2.0 28





  65 18  95   


 


2/14/20 16:00  72      


 


2/14/20 13:18  81 18  99 Nasal Cannula 2.0 28





  79 18  95   


 


2/14/20 12:00  93      


 


2/14/20 12:00 98.1 72 18 158/70 (99) 97   


 


2/14/20 11:30 98.2 70 19 131/68 (89) 94   

















Intake and Output  


 


 2/14/20 2/15/20





 19:00 07:00


 


Output Total 680 ml 600 ml


 


Balance -680 ml -600 ml


 


  


 


Output Urine Total 680 ml 600 ml


 


# Bowel Movements  1











Laboratory Tests








Test


  2/15/20


06:15


 


White Blood Count


  10.2 K/UL


(4.8-10.8)


 


Red Blood Count


  3.07 M/UL


(4.70-6.10)  L


 


Hemoglobin


  9.6 G/DL


(14.2-18.0)  L


 


Hematocrit


  28.6 %


(42.0-52.0)  L


 


Mean Corpuscular Volume 93 FL (80-99)  


 


Mean Corpuscular Hemoglobin


  31.2 PG


(27.0-31.0)  H


 


Mean Corpuscular Hemoglobin


Concent 33.5 G/DL


(32.0-36.0)


 


Red Cell Distribution Width


  16.1 %


(11.6-14.8)  H


 


Platelet Count


  248 K/UL


(150-450)


 


Mean Platelet Volume


  5.3 FL


(6.5-10.1)  L


 


Neutrophils (%) (Auto)


  % (45.0-75.0)


 


 


Lymphocytes (%) (Auto)


  % (20.0-45.0)


 


 


Monocytes (%) (Auto)  % (1.0-10.0)  


 


Eosinophils (%) (Auto)  % (0.0-3.0)  


 


Basophils (%) (Auto)  % (0.0-2.0)  


 


Neutrophils % (Manual) Pending  


 


Lymphocytes % (Manual) Pending  


 


Platelet Estimate Pending  


 


Platelet Morphology Pending  


 


Sodium Level


  146 MMOL/L


(136-145)  H


 


Potassium Level


  3.7 MMOL/L


(3.5-5.1)


 


Chloride Level


  111 MMOL/L


()  H


 


Carbon Dioxide Level


  27 MMOL/L


(21-32)


 


Anion Gap


  8 mmol/L


(5-15)


 


Blood Urea Nitrogen


  28 mg/dL


(7-18)  H


 


Creatinine


  1.3 MG/DL


(0.55-1.30)


 


Estimat Glomerular Filtration


Rate 52.0 mL/min


(>60)


 


Glucose Level


  165 MG/DL


()  H


 


Calcium Level


  8.8 MG/DL


(8.5-10.1)











Microbiology








 Date/Time


Source Procedure


Growth Status


 


 


 2/12/20 12:15


Blood Blood Culture - Preliminary


NO GROWTH AFTER 48 HOURS Resulted


 


 2/12/20 12:00


Blood Blood Culture - Preliminary


NO GROWTH AFTER 48 HOURS Resulted





 2/12/20 17:50


Sputum Expectorated Gram Stain - Final Complete


 


 2/12/20 17:50 Sputum Culture - Final


Pseudomonas Aeruginosa Complete








Objective


HEAD AND NECK:  No JVD.


LUNGS:  Coarse rhonchi.


CARDIOVASCULAR:  Regular S1 and S2 with no gallop or murmur.


ABDOMEN:  Soft.


EXTREMITIES:  No pitting edema.











Bob Fuentes MD Feb 15, 2020 09:12

## 2020-02-15 NOTE — NUR
NURSE NOTES:



Family is at bedside. Educated patient's family member the indication of restraints at this 
time. Patient is observed attempting to pull maldonado catheter and as well as getting out bed. 
Family member verbalized understanding. Will continue to observe and monitor.

## 2020-02-15 NOTE — CARDIAC ELECTROPHYSIOLOGY PN
Assessment/Plan


Assessment/Plan


1. Non-ST elevation myocardial infarction  type 2 in view of renal failure. Due 

to demand ischemia in view of


patient's sepsis, white count of 20,000.  The patient also has renal


failure with creatinine of 2.8.  Echo  showed Nl EF 55%. LE duplex no DVT


On aspirin, Lopressor 25 bid, and Lipitor 40. Hx of CABG. No need for further 

troponin





2. Status post St You pacemaker.  Interrogated and showed Nl Fx. 


     Battery more than a year left


3. Sepsis, COPD, pneumonia and hypoxia.  On Zosyn per ID


   DDimer was positive but VQ low probability 2/14/20


4. Advanced dementia.


5. Dysphagia,  passed Swallow eval but refusing eating





DW RN





Subjective


Subjective


Family took off the restraints and he fell. Back on restraints.  .


Passed swallow eval. A pacing on tele. Pacer interrogation showed Nl Fx


DDimer was high and had VQ scan that was low probability 2/14/20





Objective





Last 24 Hour Vital Signs








  Date Time  Temp Pulse Resp B/P (MAP) Pulse Ox O2 Delivery O2 Flow Rate FiO2


 


2/15/20 07:09     94 Nasal Cannula 2.0 28


 


2/15/20 07:08  79 22  100 Nasal Cannula 2.0 28





  76 18  96   


 


2/15/20 04:00  61      


 


2/15/20 04:00 97.7 62 20 147/62 (90) 95   


 


2/15/20 01:20  78 17  100 Nasal Cannula 2.0 28





  60 18  96   


 


2/15/20 00:00  60      


 


2/15/20 00:00 97.8 60 19 154/63 (93) 96   


 


2/14/20 21:57  60  150/70    


 


2/14/20 21:00      Nasal Cannula 2.0 


 


2/14/20 20:00  78      


 


2/14/20 20:00 97.9 60 18 150/70 (96) 96   


 


2/14/20 19:31     95 Nasal Cannula 2.0 28


 


2/14/20 19:31  83 18  100 Nasal Cannula 2.0 28





  65 18  95   


 


2/14/20 16:00  72      


 


2/14/20 13:18  81 18  99 Nasal Cannula 2.0 28





  79 18  95   


 


2/14/20 12:00  93      


 


2/14/20 12:00 98.1 72 18 158/70 (99) 97   


 


2/14/20 11:30 98.2 70 19 131/68 (89) 94   

















Intake and Output  


 


 2/14/20 2/15/20





 19:00 07:00


 


Output Total 680 ml 600 ml


 


Balance -680 ml -600 ml


 


  


 


Output Urine Total 680 ml 600 ml


 


# Bowel Movements  1











Laboratory Tests








Test


  2/15/20


06:15


 


White Blood Count


  10.2 K/UL


(4.8-10.8)


 


Red Blood Count


  3.07 M/UL


(4.70-6.10)  L


 


Hemoglobin


  9.6 G/DL


(14.2-18.0)  L


 


Hematocrit


  28.6 %


(42.0-52.0)  L


 


Mean Corpuscular Volume 93 FL (80-99)  


 


Mean Corpuscular Hemoglobin


  31.2 PG


(27.0-31.0)  H


 


Mean Corpuscular Hemoglobin


Concent 33.5 G/DL


(32.0-36.0)


 


Red Cell Distribution Width


  16.1 %


(11.6-14.8)  H


 


Platelet Count


  248 K/UL


(150-450)


 


Mean Platelet Volume


  5.3 FL


(6.5-10.1)  L


 


Neutrophils (%) (Auto)


  % (45.0-75.0)


 


 


Lymphocytes (%) (Auto)


  % (20.0-45.0)


 


 


Monocytes (%) (Auto)  % (1.0-10.0)  


 


Eosinophils (%) (Auto)  % (0.0-3.0)  


 


Basophils (%) (Auto)  % (0.0-2.0)  


 


Neutrophils % (Manual) Pending  


 


Lymphocytes % (Manual) Pending  


 


Platelet Estimate Pending  


 


Platelet Morphology Pending  


 


Sodium Level


  146 MMOL/L


(136-145)  H


 


Potassium Level


  3.7 MMOL/L


(3.5-5.1)


 


Chloride Level


  111 MMOL/L


()  H


 


Carbon Dioxide Level


  27 MMOL/L


(21-32)


 


Anion Gap


  8 mmol/L


(5-15)


 


Blood Urea Nitrogen


  28 mg/dL


(7-18)  H


 


Creatinine


  1.3 MG/DL


(0.55-1.30)


 


Estimat Glomerular Filtration


Rate 52.0 mL/min


(>60)


 


Glucose Level


  165 MG/DL


()  H


 


Calcium Level


  8.8 MG/DL


(8.5-10.1)











Microbiology








 Date/Time


Source Procedure


Growth Status


 


 


 2/12/20 12:15


Blood Blood Culture - Preliminary


NO GROWTH AFTER 48 HOURS Resulted


 


 2/12/20 12:00


Blood Blood Culture - Preliminary


NO GROWTH AFTER 48 HOURS Resulted





 2/12/20 17:50


Sputum Expectorated Gram Stain - Final Complete


 


 2/12/20 17:50 Sputum Culture - Final


Pseudomonas Aeruginosa Complete








Objective


HEAD AND NECK:  No JVD.


LUNGS:  Coarse rhonchi.


CARDIOVASCULAR:  Regular S1 and S2 with no gallop or murmur.


ABDOMEN:  Soft.


EXTREMITIES:  No pitting edema.











Bob Fuentes MD Feb 15, 2020 09:14

## 2020-02-15 NOTE — NUR
NURSE NOTES:



Report received from Dm TOLBERT. Patient is awake and confused; needs frequent reminders. 
Respiratory even and unlabored. No s/s of acute distress at this time. IVF is running at a 
prescribed rate. IV site is asymptomatic, patent, and intact. Rapp catheter is patent and 
intact. Urine observed to be tea colored, with minimal hematuria. Bed is in lowest position 
with side rails up x2 and brakes are engaged. Bed alarm is on. Will continue to monitor.

## 2020-02-15 NOTE — GENERAL PROGRESS NOTE
Assessment/Plan


Status:  progressing, unchanged





Subjective


Allergies:  


Coded Allergies:  


     ACETAMINOPHEN (Verified  Allergy, Unknown, 2/12/20)


     HYDROCODONE (Verified  Allergy, Unknown, 2/12/20)





Objective





Last 24 Hour Vital Signs








  Date Time  Temp Pulse Resp B/P (MAP) Pulse Ox O2 Delivery O2 Flow Rate FiO2


 


2/15/20 07:09     94 Nasal Cannula 2.0 28


 


2/15/20 07:08  79 22  100 Nasal Cannula 2.0 28





  76 18  96   


 


2/15/20 04:00  61      


 


2/15/20 04:00 97.7 62 20 147/62 (90) 95   


 


2/15/20 01:20  78 17  100 Nasal Cannula 2.0 28





  60 18  96   


 


2/15/20 00:00  60      


 


2/15/20 00:00 97.8 60 19 154/63 (93) 96   


 


2/14/20 21:57  60  150/70    


 


2/14/20 21:00      Nasal Cannula 2.0 


 


2/14/20 20:00  78      


 


2/14/20 20:00 97.9 60 18 150/70 (96) 96   


 


2/14/20 19:31     95 Nasal Cannula 2.0 28


 


2/14/20 19:31  83 18  100 Nasal Cannula 2.0 28





  65 18  95   


 


2/14/20 16:00  72      


 


2/14/20 13:18  81 18  99 Nasal Cannula 2.0 28





  79 18  95   


 


2/14/20 12:00  93      


 


2/14/20 12:00 98.1 72 18 158/70 (99) 97   


 


2/14/20 11:30 98.2 70 19 131/68 (89) 94   

















Intake and Output  


 


 2/14/20 2/15/20





 19:00 07:00


 


Output Total 680 ml 600 ml


 


Balance -680 ml -600 ml


 


  


 


Output Urine Total 680 ml 600 ml


 


# Bowel Movements  1








Laboratory Tests


2/15/20 06:15: 


White Blood Count 10.2, Red Blood Count 3.07L, Hemoglobin 9.6L, Hematocrit 28.6L

, Mean Corpuscular Volume 93, Mean Corpuscular Hemoglobin 31.2H, Mean 

Corpuscular Hemoglobin Concent 33.5, Red Cell Distribution Width 16.1H, 

Platelet Count 248, Mean Platelet Volume 5.3L, Neutrophils (%) (Auto) , 

Lymphocytes (%) (Auto) , Monocytes (%) (Auto) , Eosinophils (%) (Auto) , 

Basophils (%) (Auto) , Neutrophils % (Manual) [Pending], Lymphocytes % (Manual) 

[Pending], Platelet Estimate [Pending], Platelet Morphology [Pending], Sodium 

Level 146H, Potassium Level 3.7, Chloride Level 111H, Carbon Dioxide Level 27, 

Anion Gap 8, Blood Urea Nitrogen 28H, Creatinine 1.3, Estimat Glomerular 

Filtration Rate 52.0, Glucose Level 165H, Calcium Level 8.8


Height (Feet):  5


Height (Inches):  11.00


Weight (Pounds):  200











Kandy Hardwick MD Feb 15, 2020 09:54

## 2020-02-15 NOTE — NUR
NURSE NOTES:



Patient is refusing to eat at this time. Son is at bedside; encouraged son to attempt 
feeding patient, however, patient still refused. Will continue to reassess.

## 2020-02-15 NOTE — INFECTIOUS DISEASES PROG NOTE
Assessment/Plan


Problems:  


(1) Basal pneumonia of both lungs


Assessment & Plan:  confirmed on CT chest , with cough and congestion , suspect 

aspiration , due to pseudomonas aeruginosa resistant to  zosyn , will switch to 

cefepime treatment for 2-3 weeks . aspiration precaution, keep HOB > 30 degree.

  





(2) COPD (chronic obstructive pulmonary disease)


Assessment & Plan:  with exacerbation , continue antibiotics , inhalers , 

titrate oxygen as needed 





(3) Leukocytosis


Assessment & Plan:  possible sepsis due to the above, on zosyn and zyvox  with 

negative blood culture for 24 hrs , will stop zyvox  





(4) JOAQUIN (acute kidney injury)


Assessment & Plan:  continue hydration, with close monitoring of renal function 

, avoid nephrotoxics 





(5) Respiratory failure with hypoxia


Assessment & Plan:  suspect due to the above, V/Q scan ruled out PE as an 

etiology , and CT chest confirmed pneumonia ,pulmonary is following  








Subjective


ROS Limited/Unobtainable:  Yes


Allergies:  


Coded Allergies:  


     ACETAMINOPHEN (Verified  Allergy, Unknown, 2/12/20)


     HYDROCODONE (Verified  Allergy, Unknown, 2/12/20)


Subjective


He was more awake and alert, but still confused and agitated on restrains .  on 

high flow oxygen with  less requirements ,  A febrile , still congested





Objective


Vital Signs





Last 24 Hour Vital Signs








  Date Time  Temp Pulse Resp B/P (MAP) Pulse Ox O2 Delivery O2 Flow Rate FiO2


 


2/15/20 13:23  80 24  100 Nasal Cannula 2.0 28





  74 20  95   


 


2/15/20 12:00 97.2 60 20 142/61 (88) 97   


 


2/15/20 11:46  84      


 


2/15/20 09:37  95  111/64    


 


2/15/20 09:00      Nasal Cannula 2.0 


 


2/15/20 08:00 97.9 95 20 111/64 (80) 95   


 


2/15/20 07:52  61      


 


2/15/20 07:09     94 Nasal Cannula 2.0 28


 


2/15/20 07:08  79 22  100 Nasal Cannula 2.0 28





  76 18  96   


 


2/15/20 04:00  61      


 


2/15/20 04:00 97.7 62 20 147/62 (90) 95   


 


2/15/20 01:20  78 17  100 Nasal Cannula 2.0 28





  60 18  96   


 


2/15/20 00:00  60      


 


2/15/20 00:00 97.8 60 19 154/63 (93) 96   


 


2/14/20 21:57  60  150/70    


 


2/14/20 21:00      Nasal Cannula 2.0 


 


2/14/20 20:00  78      


 


2/14/20 20:00 97.9 60 18 150/70 (96) 96   


 


2/14/20 19:31     95 Nasal Cannula 2.0 28


 


2/14/20 19:31  83 18  100 Nasal Cannula 2.0 28





  65 18  95   


 


2/14/20 16:00  72      








Height (Feet):  5


Height (Inches):  11.00


Weight (Pounds):  200


General Appearance:  no acute distress, cachetic


HEENT:  normocephalic, atraumatic, anicteric, mucous membranes moist, PERRL


Respiratory/Chest:  chest wall non-tender, no respiratory distress, no 

accessory muscle use, decreased breath sounds


Cardiovascular:  normal peripheral pulses, normal rate, regular rhythm, no 

gallop/murmur, no JVD


Abdomen:  normal bowel sounds, soft, non tender, no organomegaly, non distended

, no mass, no scars


Genitourinary:  normal external genitalia


Extremities:  no cyanosis, no clubbing


Skin:  no rash, no lesions, no ulcers


Neurologic/Psychiatric:  alert, responsive


Lymphatic:  no neck adenopathy, no groin adenopathy


Musculoskeletal:  normal muscle bulk, no effusion





Microbiology








 Date/Time


Source Procedure


Growth Status


 


 


 2/12/20 17:50


Sputum Expectorated Gram Stain - Final Complete


 


 2/12/20 17:50 Sputum Culture - Final


Pseudomonas Aeruginosa Complete











Laboratory Tests








Test


  2/15/20


06:15


 


White Blood Count


  10.2 K/UL


(4.8-10.8)


 


Red Blood Count


  3.07 M/UL


(4.70-6.10)  L


 


Hemoglobin


  9.6 G/DL


(14.2-18.0)  L


 


Hematocrit


  28.6 %


(42.0-52.0)  L


 


Mean Corpuscular Volume 93 FL (80-99)  


 


Mean Corpuscular Hemoglobin


  31.2 PG


(27.0-31.0)  H


 


Mean Corpuscular Hemoglobin


Concent 33.5 G/DL


(32.0-36.0)


 


Red Cell Distribution Width


  16.1 %


(11.6-14.8)  H


 


Platelet Count


  248 K/UL


(150-450)


 


Mean Platelet Volume


  5.3 FL


(6.5-10.1)  L


 


Neutrophils (%) (Auto)


  % (45.0-75.0)


 


 


Lymphocytes (%) (Auto)


  % (20.0-45.0)


 


 


Monocytes (%) (Auto)  % (1.0-10.0)  


 


Eosinophils (%) (Auto)  % (0.0-3.0)  


 


Basophils (%) (Auto)  % (0.0-2.0)  


 


Differential Total Cells


Counted 100  


 


 


Neutrophils % (Manual) 94 % (45-75)  H


 


Lymphocytes % (Manual) 3 % (20-45)  L


 


Monocytes % (Manual) 3 % (1-10)  


 


Eosinophils % (Manual) 0 % (0-3)  


 


Basophils % (Manual) 0 % (0-2)  


 


Band Neutrophils 0 % (0-8)  


 


Platelet Estimate Adequate  


 


Platelet Morphology Normal  


 


Hypochromasia 2+  


 


Anisocytosis 1+  


 


Spherocytes 1+  


 


Sodium Level


  146 MMOL/L


(136-145)  H


 


Potassium Level


  3.7 MMOL/L


(3.5-5.1)


 


Chloride Level


  111 MMOL/L


()  H


 


Carbon Dioxide Level


  27 MMOL/L


(21-32)


 


Anion Gap


  8 mmol/L


(5-15)


 


Blood Urea Nitrogen


  28 mg/dL


(7-18)  H


 


Creatinine


  1.3 MG/DL


(0.55-1.30)


 


Estimat Glomerular Filtration


Rate 52.0 mL/min


(>60)


 


Glucose Level


  165 MG/DL


()  H


 


Calcium Level


  8.8 MG/DL


(8.5-10.1)











Current Medications








 Medications


  (Trade)  Dose


 Ordered  Sig/Fito


 Route


 PRN Reason  Start Time


 Stop Time Status Last Admin


Dose Admin


 


 Albuterol/


 Ipratropium


  (Albuterol/


 Ipratropium)  3 ml  Q6HRT


 HHN


   2/12/20 13:00


 2/17/20 12:59  2/15/20 13:22


 


 


 Aspirin


  (ASA)  81 mg  DAILY


 ORAL


   2/13/20 09:00


 3/14/20 08:59  2/15/20 09:38


 


 


 Atorvastatin


 Calcium


  (Lipitor)  20 mg  BEDTIME


 ORAL


   2/12/20 21:00


 3/13/20 20:59  2/14/20 21:57


 


 


 Cefepime HCl 2 gm/


 Dextrose  110 ml @ 


 220 mls/hr  Q8HR


 IV


   2/15/20 11:30


 2/22/20 11:29  2/15/20 11:41


 


 


 Clopidogrel


 Bisulfate


  (Plavix)  75 mg  DAILY


 ORAL


   2/13/20 09:00


 3/14/20 08:59  2/15/20 09:37


 


 


 Dextrose


  (Dextrose 50%)  25 ml  Q30M  PRN


 IV


 Hypoglycemia  2/12/20 08:45


 3/13/20 08:44   


 


 


 Dextrose


  (Dextrose 50%)  50 ml  Q30M  PRN


 IV


 Hypoglycemia  2/12/20 08:45


 3/13/20 08:44   


 


 


 Divalproex Sodium


  (Depakote


 Sprinkles)  125 mg  Q12HR


 ORAL


   2/14/20 11:00


 3/13/20 17:59  2/15/20 09:38


 


 


 Docusate Sodium


  (Colace)  100 mg  THREE TIMES A  DAY


 ORAL


   2/12/20 13:00


 3/13/20 12:59  2/15/20 09:38


 


 


 Donepezil HCl


  (Aricept)  10 mg  DAILY


 ORAL


   2/13/20 09:00


 3/14/20 08:59  2/15/20 09:38


 


 


 Heparin Sodium


  (Porcine)


  (Heparin 5000


 units/ml)  5,000 units  EVERY 12  HOURS


 SUBQ


   2/12/20 09:00


 3/13/20 08:59  2/15/20 09:40


 


 


 Insulin Aspart


  (NovoLOG)    BEFORE MEALS AND  HS


 SUBQ


   2/12/20 11:30


 3/13/20 11:29  2/15/20 06:05


 


 


 Ipratropium


 Bromide


  (Atrovent)  500 mcg  Q6H  PRN


 HHN


 Shortness of Breath  2/12/20 10:00


 2/17/20 09:59   


 


 


 Lidocaine


  (Lidoderm 5%


 PATCH)  1 patch  DAILY


 TDERMAL


   2/13/20 09:00


 3/14/20 08:59  2/15/20 09:39


 


 


 Lorazepam


  (Ativan 2mg/ml


 1ml)  0.25 mg  Q12H  PRN


 IV


 Agitation  2/14/20 09:15


 2/21/20 09:14  2/14/20 19:12


 


 


 Methylprednisolone


 Sodium Succinate


  (Solu-MEDROL)  20 mg  Q12H


 IVP


   2/14/20 23:00


 3/15/20 22:59  2/15/20 11:41


 


 


 Metoprolol


 Tartrate


  (Lopressor)  25 mg  EVERY 12  HOURS


 ORAL


   2/12/20 21:00


 3/13/20 20:59  2/15/20 09:37


 


 


 Sodium Chloride  1,000 ml @ 


 50 mls/hr  Q20H


 IV


   2/12/20 12:00


 3/13/20 11:59  2/14/20 03:49


 


 


 Tamsulosin HCl


  (Flomax)  0.4 mg  DAILY


 ORAL


   2/13/20 09:00


 3/14/20 08:59  2/15/20 09:38


 

















Ana Alex M.D. Feb 15, 2020 13:52

## 2020-02-16 VITALS — SYSTOLIC BLOOD PRESSURE: 139 MMHG | DIASTOLIC BLOOD PRESSURE: 53 MMHG

## 2020-02-16 VITALS — DIASTOLIC BLOOD PRESSURE: 97 MMHG | SYSTOLIC BLOOD PRESSURE: 147 MMHG

## 2020-02-16 VITALS — DIASTOLIC BLOOD PRESSURE: 59 MMHG | SYSTOLIC BLOOD PRESSURE: 148 MMHG

## 2020-02-16 VITALS — DIASTOLIC BLOOD PRESSURE: 67 MMHG | SYSTOLIC BLOOD PRESSURE: 147 MMHG

## 2020-02-16 VITALS — SYSTOLIC BLOOD PRESSURE: 140 MMHG | DIASTOLIC BLOOD PRESSURE: 53 MMHG

## 2020-02-16 VITALS — DIASTOLIC BLOOD PRESSURE: 71 MMHG | SYSTOLIC BLOOD PRESSURE: 155 MMHG

## 2020-02-16 VITALS — SYSTOLIC BLOOD PRESSURE: 149 MMHG | DIASTOLIC BLOOD PRESSURE: 92 MMHG

## 2020-02-16 LAB
ADD MANUAL DIFF: NO
ANION GAP SERPL CALC-SCNC: 8 MMOL/L (ref 5–15)
BASOPHILS NFR BLD AUTO: 0.4 % (ref 0–2)
BUN SERPL-MCNC: 22 MG/DL (ref 7–18)
CALCIUM SERPL-MCNC: 8.6 MG/DL (ref 8.5–10.1)
CHLORIDE SERPL-SCNC: 110 MMOL/L (ref 98–107)
CO2 SERPL-SCNC: 27 MMOL/L (ref 21–32)
CREAT SERPL-MCNC: 1.1 MG/DL (ref 0.55–1.3)
EOSINOPHIL NFR BLD AUTO: 0.1 % (ref 0–3)
ERYTHROCYTE [DISTWIDTH] IN BLOOD BY AUTOMATED COUNT: 16.3 % (ref 11.6–14.8)
HCT VFR BLD CALC: 27.7 % (ref 42–52)
HGB BLD-MCNC: 9.4 G/DL (ref 14.2–18)
LYMPHOCYTES NFR BLD AUTO: 14.1 % (ref 20–45)
MCV RBC AUTO: 93 FL (ref 80–99)
MONOCYTES NFR BLD AUTO: 9.9 % (ref 1–10)
NEUTROPHILS NFR BLD AUTO: 75.5 % (ref 45–75)
PLATELET # BLD: 235 K/UL (ref 150–450)
POTASSIUM SERPL-SCNC: 3.8 MMOL/L (ref 3.5–5.1)
RBC # BLD AUTO: 2.99 M/UL (ref 4.7–6.1)
SODIUM SERPL-SCNC: 145 MMOL/L (ref 136–145)
WBC # BLD AUTO: 8.5 K/UL (ref 4.8–10.8)

## 2020-02-16 RX ADMIN — TAMSULOSIN HYDROCHLORIDE SCH MG: 0.4 CAPSULE ORAL at 09:06

## 2020-02-16 RX ADMIN — IPRATROPIUM BROMIDE AND ALBUTEROL SULFATE SCH ML: .5; 3 SOLUTION RESPIRATORY (INHALATION) at 00:06

## 2020-02-16 RX ADMIN — DOCUSATE SODIUM SCH MG: 100 CAPSULE, LIQUID FILLED ORAL at 09:06

## 2020-02-16 RX ADMIN — INSULIN ASPART SCH UNITS: 100 INJECTION, SOLUTION INTRAVENOUS; SUBCUTANEOUS at 06:30

## 2020-02-16 RX ADMIN — SODIUM CHLORIDE SCH MLS/HR: 0.9 INJECTION INTRAVENOUS at 21:35

## 2020-02-16 RX ADMIN — LORAZEPAM PRN MG: 2 INJECTION, SOLUTION INTRAMUSCULAR; INTRAVENOUS at 06:59

## 2020-02-16 RX ADMIN — SODIUM CHLORIDE SCH MLS/HR: 0.9 INJECTION INTRAVENOUS at 06:41

## 2020-02-16 RX ADMIN — DIVALPROEX SODIUM SCH MG: 125 CAPSULE ORAL at 09:06

## 2020-02-16 RX ADMIN — IPRATROPIUM BROMIDE AND ALBUTEROL SULFATE SCH ML: .5; 3 SOLUTION RESPIRATORY (INHALATION) at 06:54

## 2020-02-16 RX ADMIN — INSULIN ASPART SCH UNITS: 100 INJECTION, SOLUTION INTRAVENOUS; SUBCUTANEOUS at 16:30

## 2020-02-16 RX ADMIN — ASPIRIN 81 MG SCH MG: 81 TABLET ORAL at 09:06

## 2020-02-16 RX ADMIN — IPRATROPIUM BROMIDE AND ALBUTEROL SULFATE SCH ML: .5; 3 SOLUTION RESPIRATORY (INHALATION) at 13:04

## 2020-02-16 RX ADMIN — IPRATROPIUM BROMIDE AND ALBUTEROL SULFATE SCH ML: .5; 3 SOLUTION RESPIRATORY (INHALATION) at 19:59

## 2020-02-16 RX ADMIN — INSULIN ASPART SCH UNITS: 100 INJECTION, SOLUTION INTRAVENOUS; SUBCUTANEOUS at 11:30

## 2020-02-16 RX ADMIN — DOCUSATE SODIUM SCH MG: 100 CAPSULE, LIQUID FILLED ORAL at 14:32

## 2020-02-16 RX ADMIN — DONEPEZIL HYDROCHLORIDE SCH MG: 10 TABLET, FILM COATED ORAL at 09:06

## 2020-02-16 RX ADMIN — HEPARIN SODIUM SCH UNITS: 5000 INJECTION INTRAVENOUS; SUBCUTANEOUS at 21:33

## 2020-02-16 RX ADMIN — SODIUM CHLORIDE SCH MLS/HR: 0.9 INJECTION INTRAVENOUS at 14:32

## 2020-02-16 RX ADMIN — DOCUSATE SODIUM SCH MG: 100 CAPSULE, LIQUID FILLED ORAL at 18:08

## 2020-02-16 RX ADMIN — HEPARIN SODIUM SCH UNITS: 5000 INJECTION INTRAVENOUS; SUBCUTANEOUS at 09:07

## 2020-02-16 RX ADMIN — INSULIN ASPART SCH UNITS: 100 INJECTION, SOLUTION INTRAVENOUS; SUBCUTANEOUS at 21:34

## 2020-02-16 RX ADMIN — METHYLPREDNISOLONE SODIUM SUCCINATE SCH MG: 40 INJECTION, POWDER, LYOPHILIZED, FOR SOLUTION INTRAMUSCULAR; INTRAVENOUS at 11:59

## 2020-02-16 NOTE — NUR
NURSE NOTES:

Received report from Mary TOLBERT. pt in bed awake and disoriented. On bilateral soft wrist 
restraint intact and no skin breakdown noted. IV site in LFA 22G SL patent and asymptomatic. 
Rapp cath patent and note with yellow color urine in drainage bag. Call light within easy 
reach. Bed in lowest position and locked. Will continue to plan of care.

## 2020-02-16 NOTE — PULMONOLOGY PROGRESS NOTE
Assessment/Plan


Problems:  


(1) Basal pneumonia of both lungs


(2) Dysphasia


(3) Leukocytosis


(4) COPD (chronic obstructive pulmonary disease)


(5) JOAQUIN (acute kidney injury)


(6) Respiratory failure with hypoxia


(7) Elevated WBC count


(8) Failure to thrive


Assessment/Plan


ASSESSMENT:  The patient is an 89-year-old male with a history of dementia, 

chronic obstructive pulmonary disease, prior sternotomy, congestive heart 

failure, and sick sinus syndrome, status post pacemaker, presenting with a 

respiratory illness, likely healthcare-associated pneumonia with acute kidney 

injury and dehydration.





PROBLEM LIST:


1. Healthcare-associated pneumonia.


2. Acute hypoxemic respiratory failure secondary to above.


3. Acute kidney injury.


4. Dehydration.


5. Non ST-elevation myocardial infarction, likely demand ischemia.


6. Chronic obstructive pulmonary disease with acute exacerbation.


7. Hypertension.


8. Hypertensive heart disease.


9. Hyperlipidemia.


10. Diabetes type 2.


11. CAD, status post CABG and PCI.


12. History of Mobitz type II heart block and sick sinus syndrome,status post 

pacemaker.


13. Peripheral vascular disease.


14. History of angioedema in the past.


15. Dementia.





TREATMENT PLAN:


1. Optimize pulmonary hygiene/mobilize as tolerated.


2. Titrate down FiO2 to keep saturations greater than 90%.


3. Round-the-clock and p.r.n. bronchodilators.


4. D/C Solu-Medrol and observe off steroids.


5. Antibiotics per ID.


6. Monitor volumes and renal function 


7. Aspiration precautions, PO as able


8. DVT prophylaxis, heparin subcutaneous.


9. The patient is Full Code, continue to discuss goals of care.





Subjective


Allergies:  


Coded Allergies:  


     ACETAMINOPHEN (Verified  Allergy, Unknown, 2/12/20)


     HYDROCODONE (Verified  Allergy, Unknown, 2/12/20)


Subjective


AFVSS now on 2L


Barely eating


Less interactive today


No cough no distress no wheezing no FC





Objective





Last 24 Hour Vital Signs








  Date Time  Temp Pulse Resp B/P (MAP) Pulse Ox O2 Delivery O2 Flow Rate FiO2


 


2/16/20 16:00 97.9 60 17 148/59 (88) 94   


 


2/16/20 13:05  71 18  99 Nasal Cannula 2.0 28





  68 18  97   


 


2/16/20 12:00 98.9 65 19 140/53 (82) 95   


 


2/16/20 09:06  76  139/53    


 


2/16/20 09:00      Nasal Cannula 2.0 


 


2/16/20 08:00 98.2 76 18 139/53 (81) 96   


 


2/16/20 06:54  73 18  99 Nasal Cannula 2.0 28





  71 18  99   


 


2/16/20 06:54     99 Nasal Cannula 2.0 28


 


2/16/20 04:00  85      


 


2/16/20 04:00 98.0 62 19 149/92 (111) 96   


 


2/16/20 00:16  76 20  98 Nasal Cannula 2.0 28





  72 20  93   


 


2/16/20 00:00  68      


 


2/16/20 00:00 98.1 66 18 147/97 (114) 95   


 


2/15/20 21:44  64  139/66    


 


2/15/20 21:00      Nasal Cannula 2.0 


 


2/15/20 20:00 97.9 64 19 139/66 (90) 97   


 


2/15/20 20:00  65      


 


2/15/20 19:19  68 22  100 Nasal Cannula 2.0 28





  63 22  97   


 


2/15/20 19:15     97 Nasal Cannula 2.0 28

















Intake and Output  


 


 2/15/20 2/16/20





 19:00 07:00


 


Intake Total 1000 ml 598 ml


 


Output Total 1000 ml 1600 ml


 


Balance 0 ml -1002 ml


 


  


 


Intake Oral 240 ml 


 


IV Total 760 ml 598 ml


 


Output Urine Total 1000 ml 1600 ml


 


# Bowel Movements  1








General Appearance:  no acute distress, cachetic


HEENT:  normocephalic, atraumatic, anicteric, mucous membranes moist


Respiratory/Chest:  lungs clear - but distant


Cardiovascular:  normal peripheral pulses, normal rate, regular rhythm


Abdomen:  normal bowel sounds, soft, non tender, no organomegaly, non distended

, no mass


Extremities:  no cyanosis, no clubbing, no edema


Laboratory Tests


2/16/20 06:25: 


White Blood Count 8.5, Red Blood Count 2.99L, Hemoglobin 9.4L, Hematocrit 27.7L

, Mean Corpuscular Volume 93, Mean Corpuscular Hemoglobin 31.5H, Mean 

Corpuscular Hemoglobin Concent 34.0, Red Cell Distribution Width 16.3H, 

Platelet Count 235, Mean Platelet Volume 5.1L, Neutrophils (%) (Auto) 75.5H, 

Lymphocytes (%) (Auto) 14.1L, Monocytes (%) (Auto) 9.9, Eosinophils (%) (Auto) 

0.1, Basophils (%) (Auto) 0.4, Sodium Level 145, Potassium Level 3.8, Chloride 

Level 110H, Carbon Dioxide Level 27, Anion Gap 8, Blood Urea Nitrogen 22H, 

Creatinine 1.1, Estimat Glomerular Filtration Rate > 60, Glucose Level 120H, 

Calcium Level 8.6





Current Medications








 Medications


  (Trade)  Dose


 Ordered  Sig/Fito


 Route


 PRN Reason  Start Time


 Stop Time Status Last Admin


Dose Admin


 


 Albuterol/


 Ipratropium


  (Albuterol/


 Ipratropium)  3 ml  Q6HRT


 HHN


   2/16/20 19:00


 2/17/20 12:59   


 


 


 Aspirin


  (ASA)  81 mg  DAILY


 ORAL


   2/17/20 09:00


 3/14/20 08:59   


 


 


 Atorvastatin


 Calcium


  (Lipitor)  20 mg  BEDTIME


 ORAL


   2/16/20 21:00


 3/13/20 20:59   


 


 


 Cefepime HCl 2 gm/


 Dextrose  110 ml @ 


 220 mls/hr  Q8HR


 IV


   2/16/20 22:00


 2/22/20 11:29   


 


 


 Clopidogrel


 Bisulfate


  (Plavix)  75 mg  DAILY


 ORAL


   2/17/20 09:00


 3/14/20 08:59   


 


 


 Dextrose


  (Dextrose 50%)  25 ml  Q30M  PRN


 IV


 Hypoglycemia  2/16/20 18:15


 3/13/20 08:44   


 


 


 Dextrose


  (Dextrose 50%)  50 ml  Q30M  PRN


 IV


 Hypoglycemia  2/16/20 18:15


 3/13/20 08:44   


 


 


 Divalproex Sodium


  (Depakote


 Sprinkles)  125 mg  Q12HR


 ORAL


   2/16/20 21:00


 3/13/20 17:59   


 


 


 Docusate Sodium


  (Colace)  100 mg  THREE TIMES A  DAY


 ORAL


   2/17/20 09:00


 3/13/20 12:59   


 


 


 Donepezil HCl


  (Aricept)  10 mg  DAILY


 ORAL


   2/17/20 09:00


 3/14/20 08:59   


 


 


 Heparin Sodium


  (Porcine)


  (Heparin 5000


 units/ml)  5,000 units  EVERY 12  HOURS


 SUBQ


   2/16/20 21:00


 3/13/20 08:59   


 


 


 Insulin Aspart


  (NovoLOG)    BEFORE MEALS AND  HS


 SUBQ


   2/16/20 21:00


 3/13/20 11:29   


 


 


 Ipratropium


 Bromide


  (Atrovent)  500 mcg  Q6H  PRN


 HHN


 Shortness of Breath  2/16/20 18:15


 2/17/20 18:14   


 


 


 Lidocaine


  (Lidoderm 5%


 PATCH)  1 patch  DAILY


 TDERMAL


   2/17/20 09:00


 3/14/20 08:59   


 


 


 Lorazepam


  (Ativan 2mg/ml


 1ml)  0.25 mg  Q12H  PRN


 IV


 Agitation  2/16/20 18:15


 2/21/20 18:14   


 


 


 Methylprednisolone


 Sodium Succinate


  (Solu-MEDROL)  10 mg  Q12H


 IVP


   2/16/20 23:00


 3/15/20 22:59   


 


 


 Metoprolol


 Tartrate


  (Lopressor)  25 mg  EVERY 12  HOURS


 ORAL


   2/16/20 21:00


 3/13/20 20:59   


 


 


 Sodium Chloride  1,000 ml @ 


 50 mls/hr  Q20H


 IV


   2/16/20 18:15


 3/13/20 11:59   


 


 


 Tamsulosin HCl


  (Flomax)  0.4 mg  DAILY


 ORAL


   2/17/20 09:00


 3/14/20 08:59   


 

















Mika Aguirre MD Feb 16, 2020 18:24

## 2020-02-16 NOTE — NUR
NURSE NOTES:

Patient is sleeping comfortably in semi-fowlers position. No signs of distress noted at this 
time.

## 2020-02-16 NOTE — NUR
NURSE NOTES:



Received patient awake, confused, on bilateral soft wrist restraints. Kept clean and dry.

## 2020-02-16 NOTE — GENERAL PROGRESS NOTE
Assessment/Plan


Status:  progressing, unchanged


Assessment/Plan:


1. Pneumonia - cont cefepime 1 gm IV for total of 3 wks for peudomonas 

pneumonia per ID.  D/C planning for tomorrow. Transfer to Med surg today.


2. Leukocytosis improving, possible sepsis - improved. cont IV abx.


3. Elevated Troponin 2nd to NSTEMI due to demand Ischemia most likely due to 

KELVIN and sepsis - cardiology following.


4. COPD exacerbation - on solumedrol and pulmonary following.


5. Hypotension 2nd to sepsis and pneumonia - improved.


6. Dementia - on oral feeding and oral meds now.


7. Acute Hypoxemic respiratory Failure 2nd to pneumonia and sepsis


8. JOAQUIN - improving with hydration.


9. Hypertensive heart disease - on metoprolol 25 mg po bid.


10. HLD - cont oral meds.


11. DM II - start SSI.


12. CAD s/p CABG and stent placement.


13. H/O PVD


14. H/O angioedema in the past.





Subjective


Date patient seen:  Feb 16, 2020


Time patient seen:  11:00


Constitutional:  Reports: weakness


HEENT:  Reports: no symptoms


Cardiovascular:  Reports: no symptoms


Respiratory:  Reports: no symptoms


Gastrointestinal/Abdominal:  Reports: no symptoms


Genitourinary:  Reports: no symptoms


Neurologic/Psychiatric:  Reports: other - dementia


Endocrine:  Reports: no symptoms


Hematologic/Lymphatic:  Reports: no symptoms


Allergies:  


Coded Allergies:  


     ACETAMINOPHEN (Verified  Allergy, Unknown, 2/12/20)


     HYDROCODONE (Verified  Allergy, Unknown, 2/12/20)


Subjective


This morning he is awake and his son at bedside.  His son states he is better 

mentally but 


he still has dementia. no sob or chest pain.  no fever or chills.





Objective





Last 24 Hour Vital Signs








  Date Time  Temp Pulse Resp B/P (MAP) Pulse Ox O2 Delivery O2 Flow Rate FiO2


 


2/16/20 09:06  76  139/53    


 


2/16/20 09:00      Nasal Cannula 2.0 


 


2/16/20 08:00 98.2 76 18 139/53 (81) 96   


 


2/16/20 06:54  73 18  99 Nasal Cannula 2.0 28





  71 18  99   


 


2/16/20 06:54     99 Nasal Cannula 2.0 28


 


2/16/20 04:00  85      


 


2/16/20 04:00 98.0 62 19 149/92 (111) 96   


 


2/16/20 00:16  76 20  98 Nasal Cannula 2.0 28





  72 20  93   


 


2/16/20 00:00  68      


 


2/16/20 00:00 98.1 66 18 147/97 (114) 95   


 


2/15/20 21:44  64  139/66    


 


2/15/20 21:00      Nasal Cannula 2.0 


 


2/15/20 20:00 97.9 64 19 139/66 (90) 97   


 


2/15/20 20:00  65      


 


2/15/20 19:19  68 22  100 Nasal Cannula 2.0 28





  63 22  97   


 


2/15/20 19:15     97 Nasal Cannula 2.0 28


 


2/15/20 16:22  72      


 


2/15/20 16:12 97.0 76 20 143/63 (89) 97   


 


2/15/20 13:23  80 24  100 Nasal Cannula 2.0 28





  74 20  95   


 


2/15/20 12:00 97.2 60 20 142/61 (88) 97   


 


2/15/20 11:46  84      

















Intake and Output  


 


 2/15/20 2/16/20





 19:00 07:00


 


Intake Total 1000 ml 598 ml


 


Output Total 1000 ml 1600 ml


 


Balance 0 ml -1002 ml


 


  


 


Intake Oral 240 ml 


 


IV Total 760 ml 598 ml


 


Output Urine Total 1000 ml 1600 ml


 


# Bowel Movements  1








Laboratory Tests


2/16/20 06:25: 


White Blood Count 8.5, Red Blood Count 2.99L, Hemoglobin 9.4L, Hematocrit 27.7L

, Mean Corpuscular Volume 93, Mean Corpuscular Hemoglobin 31.5H, Mean 

Corpuscular Hemoglobin Concent 34.0, Red Cell Distribution Width 16.3H, 

Platelet Count 235, Mean Platelet Volume 5.1L, Neutrophils (%) (Auto) 75.5H, 

Lymphocytes (%) (Auto) 14.1L, Monocytes (%) (Auto) 9.9, Eosinophils (%) (Auto) 

0.1, Basophils (%) (Auto) 0.4, Sodium Level 145, Potassium Level 3.8, Chloride 

Level 110H, Carbon Dioxide Level 27, Anion Gap 8, Blood Urea Nitrogen 22H, 

Creatinine 1.1, Estimat Glomerular Filtration Rate > 60, Glucose Level 120H, 

Calcium Level 8.6


Height (Feet):  5


Height (Inches):  11.00


Weight (Pounds):  200


General Appearance:  no apparent distress, alert


EENT:  normal ENT inspection


Neck:  non-tender, supple


Cardiovascular:  normal rate, regular rhythm


Respiratory/Chest:  lungs clear, normal breath sounds, no respiratory distress


Abdomen:  normal bowel sounds, non tender, soft


Extremities:  normal range of motion, non-tender


Edema:  no edema noted Arm (L), no edema noted Arm (R), no edema noted Leg (L), 

no edema noted Leg (R), no edema noted Pedal (L), no edema noted Pedal (R), no 

edema noted Generalized


Neurologic:  alert, responsive


Skin:  warm/dry











Kandy Hardwick MD Feb 16, 2020 11:11

## 2020-02-16 NOTE — GENERAL PROGRESS NOTE
Assessment/Plan


Status:  progressing, unchanged


Assessment/Plan:


Assessment


- AMS


- anorexia


- Anemia


- Resolved azotemia


- FTT





Recommendations


- po diet as tolerated


- follow labs  and exam


- psych f/u


- May need TF





Subjective


Allergies:  


Coded Allergies:  


     ACETAMINOPHEN (Verified  Allergy, Unknown, 2/12/20)


     HYDROCODONE (Verified  Allergy, Unknown, 2/12/20)


Subjective


Confused 


d/w family at bedside


poor po per RN





Objective





Last 24 Hour Vital Signs








  Date Time  Temp Pulse Resp B/P (MAP) Pulse Ox O2 Delivery O2 Flow Rate FiO2


 


2/16/20 09:06  76  139/53    


 


2/16/20 08:00 98.2 76 18 139/53 (81) 96   


 


2/16/20 06:54  73 18  99 Nasal Cannula 2.0 28





  71 18  99   


 


2/16/20 06:54     99 Nasal Cannula 2.0 28


 


2/16/20 04:00  85      


 


2/16/20 04:00 98.0 62 19 149/92 (111) 96   


 


2/16/20 00:16  76 20  98 Nasal Cannula 2.0 28





  72 20  93   


 


2/16/20 00:00  68      


 


2/16/20 00:00 98.1 66 18 147/97 (114) 95   


 


2/15/20 21:44  64  139/66    


 


2/15/20 21:00      Nasal Cannula 2.0 


 


2/15/20 20:00 97.9 64 19 139/66 (90) 97   


 


2/15/20 20:00  65      


 


2/15/20 19:19  68 22  100 Nasal Cannula 2.0 28





  63 22  97   


 


2/15/20 19:15     97 Nasal Cannula 2.0 28


 


2/15/20 16:22  72      


 


2/15/20 16:12 97.0 76 20 143/63 (89) 97   


 


2/15/20 13:23  80 24  100 Nasal Cannula 2.0 28





  74 20  95   


 


2/15/20 12:00 97.2 60 20 142/61 (88) 97   


 


2/15/20 11:46  84      

















Intake and Output  


 


 2/15/20 2/16/20





 19:00 07:00


 


Intake Total 1000 ml 598 ml


 


Output Total 1000 ml 1600 ml


 


Balance 0 ml -1002 ml


 


  


 


Intake Oral 240 ml 


 


IV Total 760 ml 598 ml


 


Output Urine Total 1000 ml 1600 ml


 


# Bowel Movements  1








Laboratory Tests


2/16/20 06:25: 


White Blood Count 8.5, Red Blood Count 2.99L, Hemoglobin 9.4L, Hematocrit 27.7L

, Mean Corpuscular Volume 93, Mean Corpuscular Hemoglobin 31.5H, Mean 

Corpuscular Hemoglobin Concent 34.0, Red Cell Distribution Width 16.3H, 

Platelet Count 235, Mean Platelet Volume 5.1L, Neutrophils (%) (Auto) 75.5H, 

Lymphocytes (%) (Auto) 14.1L, Monocytes (%) (Auto) 9.9, Eosinophils (%) (Auto) 

0.1, Basophils (%) (Auto) 0.4, Sodium Level 145, Potassium Level 3.8, Chloride 

Level 110H, Carbon Dioxide Level 27, Anion Gap 8, Blood Urea Nitrogen 22H, 

Creatinine 1.1, Estimat Glomerular Filtration Rate > 60, Glucose Level 120H, 

Calcium Level 8.6


Height (Feet):  5


Height (Inches):  11.00


Weight (Pounds):  200


Objective


confused 


incoherent 


restrained


CTA


RR 


abd soft


no edema











Delia Cardenas MD Feb 16, 2020 10:37

## 2020-02-16 NOTE — INFECTIOUS DISEASES PROG NOTE
Assessment/Plan


Problems:  


(1) Basal pneumonia of both lungs


Assessment & Plan:  confirmed on CT chest , with cough and congestion , suspect 

aspiration , due to pseudomonas aeruginosa . continue cefepime treatment for 3 

weeks . aspiration precaution, keep HOB > 30 degree. EOT 3/7/20 





(2) COPD (chronic obstructive pulmonary disease)


Assessment & Plan:  with exacerbation , continue antibiotics , inhalers , 

titrate oxygen as needed 





(3) Leukocytosis


Assessment & Plan:  suspect due to the above, with negative blood culture x 2 , 

less likely sepsis 





(4) JOAQUIN (acute kidney injury)


Assessment & Plan:  continue hydration, with close monitoring of renal function 

, avoid nephrotoxics 





(5) Respiratory failure with hypoxia


Assessment & Plan:  suspect due to the above, V/Q scan ruled out PE as an 

etiology , and CT chest confirmed pneumonia ,pulmonary is following  








Subjective


ROS Limited/Unobtainable:  Yes


Allergies:  


Coded Allergies:  


     ACETAMINOPHEN (Verified  Allergy, Unknown, 2/12/20)


     HYDROCODONE (Verified  Allergy, Unknown, 2/12/20)


Subjective


He was awake and alert, lying in bed, but still confused and on restrains .  on 

high flow oxygen with  less requirements ,  A febrile , less  congested





Objective


Vital Signs





Last 24 Hour Vital Signs








  Date Time  Temp Pulse Resp B/P (MAP) Pulse Ox O2 Delivery O2 Flow Rate FiO2


 


2/16/20 21:32  60  155/71    


 


2/16/20 20:26      Nasal Cannula 2.0 


 


2/16/20 20:23 97.3 60 20 155/71 (99) 96   


 


2/16/20 19:59  68 18  99 Nasal Cannula 2.0 28





  64 20  96   


 


2/16/20 19:59     96 Nasal Cannula 2.0 28


 


2/16/20 18:38 97.5 60 17 147/67 (93) 94   


 


2/16/20 16:00 97.9 60 17 148/59 (88) 94   


 


2/16/20 13:05  71 18  99 Nasal Cannula 2.0 28





  68 18  97   


 


2/16/20 12:00 98.9 65 19 140/53 (82) 95   


 


2/16/20 09:06  76  139/53    


 


2/16/20 09:00      Nasal Cannula 2.0 


 


2/16/20 08:00 98.2 76 18 139/53 (81) 96   


 


2/16/20 06:54  73 18  99 Nasal Cannula 2.0 28





  71 18  99   


 


2/16/20 06:54     99 Nasal Cannula 2.0 28


 


2/16/20 04:00  85      


 


2/16/20 04:00 98.0 62 19 149/92 (111) 96   


 


2/16/20 00:16  76 20  98 Nasal Cannula 2.0 28





  72 20  93   


 


2/16/20 00:00  68      


 


2/16/20 00:00 98.1 66 18 147/97 (114) 95   








Height (Feet):  5


Height (Inches):  11.00


Weight (Pounds):  200


General Appearance:  WD/WN, no acute distress


HEENT:  normocephalic, atraumatic, anicteric, mucous membranes moist, PERRL


Respiratory/Chest:  chest wall non-tender, no respiratory distress, no 

accessory muscle use


Cardiovascular:  normal peripheral pulses, normal rate, regular rhythm, no 

gallop/murmur, no JVD


Abdomen:  normal bowel sounds, soft, non tender, no organomegaly, non distended

, no mass, no scars


Genitourinary:  normal external genitalia


Extremities:  no cyanosis, no clubbing


Skin:  no rash, no lesions


Neurologic/Psychiatric:  alert, unresponsiveness


Lymphatic:  no neck adenopathy, no groin adenopathy


Musculoskeletal:  normal muscle bulk, no effusion





Laboratory Tests








Test


  2/16/20


06:25


 


White Blood Count


  8.5 K/UL


(4.8-10.8)


 


Red Blood Count


  2.99 M/UL


(4.70-6.10)  L


 


Hemoglobin


  9.4 G/DL


(14.2-18.0)  L


 


Hematocrit


  27.7 %


(42.0-52.0)  L


 


Mean Corpuscular Volume 93 FL (80-99)  


 


Mean Corpuscular Hemoglobin


  31.5 PG


(27.0-31.0)  H


 


Mean Corpuscular Hemoglobin


Concent 34.0 G/DL


(32.0-36.0)


 


Red Cell Distribution Width


  16.3 %


(11.6-14.8)  H


 


Platelet Count


  235 K/UL


(150-450)


 


Mean Platelet Volume


  5.1 FL


(6.5-10.1)  L


 


Neutrophils (%) (Auto)


  75.5 %


(45.0-75.0)  H


 


Lymphocytes (%) (Auto)


  14.1 %


(20.0-45.0)  L


 


Monocytes (%) (Auto)


  9.9 %


(1.0-10.0)


 


Eosinophils (%) (Auto)


  0.1 %


(0.0-3.0)


 


Basophils (%) (Auto)


  0.4 %


(0.0-2.0)


 


Sodium Level


  145 MMOL/L


(136-145)


 


Potassium Level


  3.8 MMOL/L


(3.5-5.1)


 


Chloride Level


  110 MMOL/L


()  H


 


Carbon Dioxide Level


  27 MMOL/L


(21-32)


 


Anion Gap


  8 mmol/L


(5-15)


 


Blood Urea Nitrogen


  22 mg/dL


(7-18)  H


 


Creatinine


  1.1 MG/DL


(0.55-1.30)


 


Estimat Glomerular Filtration


Rate > 60 mL/min


(>60)


 


Glucose Level


  120 MG/DL


()  H


 


Calcium Level


  8.6 MG/DL


(8.5-10.1)











Current Medications








 Medications


  (Trade)  Dose


 Ordered  Sig/Fito


 Route


 PRN Reason  Start Time


 Stop Time Status Last Admin


Dose Admin


 


 Albuterol/


 Ipratropium


  (Albuterol/


 Ipratropium)  3 ml  Q6HRT


 HHN


   2/16/20 19:00


 2/17/20 12:59  2/16/20 19:59


 


 


 Aspirin


  (ASA)  81 mg  DAILY


 ORAL


   2/17/20 09:00


 3/14/20 08:59   


 


 


 Atorvastatin


 Calcium


  (Lipitor)  20 mg  BEDTIME


 ORAL


   2/16/20 21:00


 3/13/20 20:59  2/16/20 21:32


 


 


 Cefepime HCl 2 gm/


 Dextrose  110 ml @ 


 220 mls/hr  Q8HR


 IV


   2/16/20 22:00


 2/22/20 11:29  2/16/20 21:35


 


 


 Clopidogrel


 Bisulfate


  (Plavix)  75 mg  DAILY


 ORAL


   2/17/20 09:00


 3/14/20 08:59   


 


 


 Dextrose


  (Dextrose 50%)  25 ml  Q30M  PRN


 IV


 Hypoglycemia  2/16/20 18:15


 3/13/20 08:44   


 


 


 Dextrose


  (Dextrose 50%)  50 ml  Q30M  PRN


 IV


 Hypoglycemia  2/16/20 18:15


 3/13/20 08:44   


 


 


 Docusate Sodium


  (Colace)  100 mg  THREE TIMES A  DAY


 ORAL


   2/17/20 09:00


 3/13/20 12:59   


 


 


 Donepezil HCl


  (Aricept)  10 mg  DAILY


 ORAL


   2/17/20 09:00


 3/14/20 08:59   


 


 


 Heparin Sodium


  (Porcine)


  (Heparin 5000


 units/ml)  5,000 units  EVERY 12  HOURS


 SUBQ


   2/16/20 21:00


 3/13/20 08:59  2/16/20 21:33


 


 


 Insulin Aspart


  (NovoLOG)    BEFORE MEALS AND  HS


 SUBQ


   2/16/20 21:00


 3/13/20 11:29  2/16/20 21:34


 


 


 Ipratropium


 Bromide


  (Atrovent)  500 mcg  Q6H  PRN


 HHN


 Shortness of Breath  2/16/20 18:15


 2/17/20 18:14   


 


 


 Lidocaine


  (Lidoderm 5%


 PATCH)  1 patch  DAILY


 TDERMAL


   2/17/20 09:00


 3/14/20 08:59   


 


 


 Lorazepam


  (Ativan 2mg/ml


 1ml)  0.25 mg  Q12H  PRN


 IV


 Agitation  2/16/20 18:15


 2/21/20 18:14   


 


 


 Metoprolol


 Tartrate


  (Lopressor)  25 mg  EVERY 12  HOURS


 ORAL


   2/16/20 21:00


 3/13/20 20:59  2/16/20 21:32


 


 


 Sodium Chloride  1,000 ml @ 


 50 mls/hr  Q20H


 IV


   2/16/20 18:15


 3/13/20 11:59  2/16/20 18:43


 


 


 Tamsulosin HCl


  (Flomax)  0.4 mg  DAILY


 ORAL


   2/17/20 09:00


 3/14/20 08:59   


 

















Ana Alex M.D. Feb 16, 2020 21:55

## 2020-02-16 NOTE — NUR
HAND-OFF: 



Report given to TOMASZ Alvarez. Patient is awake lying semi-shields's still restless despite 
administration of Ativan IV PRN medication. Rapp catheter draining well to gravity. In 
stable condition otherwise.

## 2020-02-16 NOTE — NUR
NURSE NOTES:

Patient transferred from Tele; I received report from TOMASZ Alvarez; belonging list sign by 
transferring and receiving nurse's; patient non verbal; on room air, no sing of distress and 
shortness of breath; no sing of chest pain; Iv Left For-Arm 22G NS 50cc running; wound 
picture taken and uploaded; wound care provided; bilateral soft wrist Restrain in place; 
side rails up x2, breaks engaged, bed at lowest position, bed alarm on; call light within 
reach; will keep monitoring.

## 2020-02-17 VITALS — SYSTOLIC BLOOD PRESSURE: 108 MMHG | DIASTOLIC BLOOD PRESSURE: 51 MMHG

## 2020-02-17 VITALS — DIASTOLIC BLOOD PRESSURE: 47 MMHG | SYSTOLIC BLOOD PRESSURE: 121 MMHG

## 2020-02-17 VITALS — DIASTOLIC BLOOD PRESSURE: 59 MMHG | SYSTOLIC BLOOD PRESSURE: 150 MMHG

## 2020-02-17 VITALS — DIASTOLIC BLOOD PRESSURE: 52 MMHG | SYSTOLIC BLOOD PRESSURE: 131 MMHG

## 2020-02-17 VITALS — DIASTOLIC BLOOD PRESSURE: 63 MMHG | SYSTOLIC BLOOD PRESSURE: 151 MMHG

## 2020-02-17 RX ADMIN — SODIUM CHLORIDE SCH MLS/HR: 0.9 INJECTION INTRAVENOUS at 14:00

## 2020-02-17 RX ADMIN — IPRATROPIUM BROMIDE AND ALBUTEROL SULFATE SCH ML: .5; 3 SOLUTION RESPIRATORY (INHALATION) at 00:29

## 2020-02-17 RX ADMIN — DOCUSATE SODIUM SCH MG: 100 CAPSULE, LIQUID FILLED ORAL at 14:00

## 2020-02-17 RX ADMIN — HEPARIN SODIUM SCH UNITS: 5000 INJECTION INTRAVENOUS; SUBCUTANEOUS at 09:54

## 2020-02-17 RX ADMIN — SODIUM CHLORIDE SCH MLS/HR: 0.9 INJECTION INTRAVENOUS at 05:00

## 2020-02-17 RX ADMIN — INSULIN ASPART SCH UNITS: 100 INJECTION, SOLUTION INTRAVENOUS; SUBCUTANEOUS at 11:30

## 2020-02-17 RX ADMIN — DOCUSATE SODIUM SCH MG: 100 CAPSULE, LIQUID FILLED ORAL at 09:00

## 2020-02-17 RX ADMIN — IPRATROPIUM BROMIDE AND ALBUTEROL SULFATE SCH ML: .5; 3 SOLUTION RESPIRATORY (INHALATION) at 06:41

## 2020-02-17 RX ADMIN — INSULIN ASPART SCH UNITS: 100 INJECTION, SOLUTION INTRAVENOUS; SUBCUTANEOUS at 06:30

## 2020-02-17 NOTE — NUR
SWALLOW STATUS/RE/EVALUATION:



S:  PATIENT CLEARED FOR ST INTERVENTION BY RN EUN.

    PATIENT SEEN IN CONTEXT OF NOON MEAL.



O:  SKILLED DYSPHAGIA MANAGEMENT



A:  PATIENT IS TOLERATING HIS CURRENT DIET TEXTURE RELATIVE TO SAFETY, HOWEVER,

    THE GREATEST BARRIER TO SAFE/SUFFICIENT P.O. INTAKE IS THE PATIENT'S

    REFUSAL TO EAT/OPPOSITIONAL TO P.O. (DEMENTIA:  LOSS OF APPETITE/REFUSAL TO EAT)

    INTAKE FOR THE LAST 3 DAYS INSUFFICIENT TO SUPPORT NUTRITION/

    HYDRATION NEEDS.



P:  CONSIDER D/W FAMILY RE;  PEG VS PALLIATIVE/COMFORT-CENTERED CARE

## 2020-02-17 NOTE — INFECTIOUS DISEASES PROG NOTE
Assessment/Plan


Problems:  


(1) Basal pneumonia of both lungs


Assessment & Plan:  confirmed on CT chest , with cough and congestion , suspect 

aspiration , due to pseudomonas aeruginosa . continue cefepime treatment for 3 

weeks . aspiration precaution, keep HOB > 30 degree. EOT 3/7/20 





(2) COPD (chronic obstructive pulmonary disease)


Assessment & Plan:  with exacerbation , continue antibiotics , inhalers , 

titrate oxygen as needed 





(3) Leukocytosis


Assessment & Plan:  suspect due to the above, with negative blood culture x 2 , 

less likely sepsis 





(4) JOAQUIN (acute kidney injury)


Assessment & Plan:  continue hydration, with close monitoring of renal function 

, avoid nephrotoxics 





(5) Respiratory failure with hypoxia


Assessment & Plan:  suspect due to the above, V/Q scan ruled out PE as an 

etiology , and CT chest confirmed pneumonia ,pulmonary is following  








Subjective


ROS Limited/Unobtainable:  Yes


Allergies:  


Coded Allergies:  


     ACETAMINOPHEN (Verified  Allergy, Unknown, 2/12/20)


     HYDROCODONE (Verified  Allergy, Unknown, 2/12/20)


Subjective


He was awake and responsive to verbal commands ,  lying in bed, still confused 

and on restrains .  on high flow oxygen with  mask , but with less requirements 

,  A febrile , less  congested





Objective


Vital Signs





Last 24 Hour Vital Signs








  Date Time  Temp Pulse Resp B/P (MAP) Pulse Ox O2 Delivery O2 Flow Rate FiO2


 


2/17/20 09:00      Nasal Cannula 2.0 


 


2/17/20 09:00  70  108/51    


 


2/17/20 08:00 98.1 70 19 108/51 (70) 92   


 


2/17/20 06:42  72 18  98 Nasal Cannula 2.0 28





  77 20  97   


 


2/17/20 06:41     97 Nasal Cannula 2.0 28


 


2/17/20 04:15 97.7 62 18 150/59 (89) 93   


 


2/17/20 00:29  68 18  98 Nasal Cannula 2.0 28





  61 16  95   


 


2/17/20 00:19 97.9 61 20 151/63 (92) 93   


 


2/16/20 21:32  60  155/71    


 


2/16/20 20:26      Nasal Cannula 2.0 


 


2/16/20 20:23 97.3 60 20 155/71 (99) 96   


 


2/16/20 19:59  68 18  99 Nasal Cannula 2.0 28





  64 20  96   


 


2/16/20 19:59     96 Nasal Cannula 2.0 28


 


2/16/20 18:38 97.5 60 17 147/67 (93) 94   


 


2/16/20 16:00 97.9 60 17 148/59 (88) 94   


 


2/16/20 13:05  71 18  99 Nasal Cannula 2.0 28





  68 18  97   








Height (Feet):  5


Height (Inches):  11.00


Weight (Pounds):  200


General Appearance:  WD/WN, other - confused, agitated


HEENT:  normocephalic, atraumatic, anicteric, mucous membranes moist, PERRL


Respiratory/Chest:  chest wall non-tender, no respiratory distress, no 

accessory muscle use, decreased breath sounds, crackles/rales


Cardiovascular:  normal peripheral pulses, normal rate, regular rhythm, no 

gallop/murmur, no JVD


Abdomen:  normal bowel sounds, soft, non tender, no organomegaly, non distended

, no mass, no scars


Genitourinary:  normal external genitalia


Extremities:  no cyanosis, no clubbing


Skin:  no rash, no lesions


Neurologic/Psychiatric:  CNs II-XII grossly normal, alert, responsive


Lymphatic:  no neck adenopathy, no groin adenopathy


Musculoskeletal:  normal muscle bulk, no effusion





Current Medications








 Medications


  (Trade)  Dose


 Ordered  Sig/Fito


 Route


 PRN Reason  Start Time


 Stop Time Status Last Admin


Dose Admin


 


 Albuterol/


 Ipratropium


  (Albuterol/


 Ipratropium)  3 ml  Q6HRT


 HHN


   2/16/20 19:00


 2/17/20 12:59  2/17/20 06:41


 


 


 Aspirin


  (ASA)  81 mg  DAILY


 ORAL


   2/17/20 09:00


 3/14/20 08:59   


 


 


 Atorvastatin


 Calcium


  (Lipitor)  20 mg  BEDTIME


 ORAL


   2/16/20 21:00


 3/13/20 20:59  2/16/20 21:32


 


 


 Cefepime HCl 2 gm/


 Dextrose  110 ml @ 


 220 mls/hr  Q8HR


 IV


   2/16/20 22:00


 2/22/20 11:29  2/17/20 05:00


 


 


 Clopidogrel


 Bisulfate


  (Plavix)  75 mg  DAILY


 ORAL


   2/17/20 09:00


 3/14/20 08:59   


 


 


 Dextrose


  (Dextrose 50%)  25 ml  Q30M  PRN


 IV


 Hypoglycemia  2/16/20 18:15


 3/13/20 08:44   


 


 


 Dextrose


  (Dextrose 50%)  50 ml  Q30M  PRN


 IV


 Hypoglycemia  2/16/20 18:15


 3/13/20 08:44   


 


 


 Docusate Sodium


  (Colace)  100 mg  THREE TIMES A  DAY


 ORAL


   2/17/20 09:00


 3/13/20 12:59   


 


 


 Donepezil HCl


  (Aricept)  10 mg  DAILY


 ORAL


   2/17/20 09:00


 3/14/20 08:59   


 


 


 Heparin Sodium


  (Porcine)


  (Heparin 5000


 units/ml)  5,000 units  EVERY 12  HOURS


 SUBQ


   2/16/20 21:00


 3/13/20 08:59  2/17/20 09:54


 


 


 Insulin Aspart


  (NovoLOG)    BEFORE MEALS AND  HS


 SUBQ


   2/16/20 21:00


 3/13/20 11:29  2/16/20 21:34


 


 


 Ipratropium


 Bromide


  (Atrovent)  500 mcg  Q6H  PRN


 HHN


 Shortness of Breath  2/16/20 18:15


 2/17/20 18:14   


 


 


 Lidocaine


  (Lidoderm 5%


 PATCH)  1 patch  DAILY


 TDERMAL


   2/17/20 09:00


 3/14/20 08:59  2/17/20 09:52


 


 


 Lorazepam


  (Ativan 2mg/ml


 1ml)  0.25 mg  Q12H  PRN


 IV


 Agitation  2/16/20 18:15


 2/21/20 18:14   


 


 


 Metoprolol


 Tartrate


  (Lopressor)  25 mg  EVERY 12  HOURS


 ORAL


   2/16/20 21:00


 3/13/20 20:59  2/16/20 21:32


 


 


 Sodium Chloride  1,000 ml @ 


 50 mls/hr  Q20H


 IV


   2/16/20 18:15


 3/13/20 11:59  2/16/20 18:43


 


 


 Tamsulosin HCl


  (Flomax)  0.4 mg  DAILY


 ORAL


   2/17/20 09:00


 3/14/20 08:59   


 

















Ana Alex M.D. Feb 17, 2020 12:24

## 2020-02-17 NOTE — NUR
*-*DISCHARGE PLANNED*-*

 

PATIENT IS BEING DISCHARGED BACK TO:



[facility]

 P:[phone]

 F:[phone]

 F:[phone]

RM# 115.A 

[facility]  S/W [facility] ETA 4PM/ 1600PM

## 2020-02-17 NOTE — PULMONOLOGY PROGRESS NOTE
Assessment/Plan


Problems:  


(1) Basal pneumonia of both lungs


(2) Dysphasia


(3) Leukocytosis


(4) COPD (chronic obstructive pulmonary disease)


(5) JOAQUIN (acute kidney injury)


(6) Respiratory failure with hypoxia


(7) Elevated WBC count


(8) Failure to thrive


Assessment/Plan


ASSESSMENT:  The patient is an 89-year-old male with a history of dementia, 

chronic obstructive pulmonary disease, prior sternotomy, congestive heart 

failure, and sick sinus syndrome, status post pacemaker, presenting with a 

respiratory illness, likely healthcare-associated pneumonia with acute kidney 

injury and dehydration.





PROBLEM LIST:


1. Healthcare-associated pneumonia/PsA PNA


2. Acute hypoxemic respiratory failure secondary to above.


3. Acute kidney injury - RESOLVED


4. Dehydration.


5. Non ST-elevation myocardial infarction, likely demand ischemia.


6. Chronic obstructive pulmonary disease with acute exacerbation.


7. Hypertension.


8. Hypertensive heart disease.


9. Hyperlipidemia.


10. Diabetes type 2.


11. CAD, status post CABG and PCI.


12. History of Mobitz type II heart block and sick sinus syndrome,status post 

pacemaker.


13. Peripheral vascular disease.


14. History of angioedema in the past.


15. Dementia.





TREATMENT PLAN:


1. Optimize pulmonary hygiene/mobilize as tolerated.


2. Titrate down FiO2 to keep saturations greater than 90%.


3. Round-the-clock and p.r.n. bronchodilators.


4. Observe off steroids.


5. Antibiotics per ID.


6. Monitor volumes and renal function 


7. Aspiration precautions, PO as able


8. DVT prophylaxis, heparin subcutaneous.


9. The patient is Full Code, continue to discuss goals of care.





Subjective


Allergies:  


Coded Allergies:  


     ACETAMINOPHEN (Verified  Allergy, Unknown, 2/12/20)


     HYDROCODONE (Verified  Allergy, Unknown, 2/12/20)


Subjective


AFVSS o2 needs stable per report eating more


MS waxes and wanes


No cough no distress no wheezing no FC





Objective





Last 24 Hour Vital Signs








  Date Time  Temp Pulse Resp B/P (MAP) Pulse Ox O2 Delivery O2 Flow Rate FiO2


 


2/17/20 12:00 98.1  16 121/47 (71) 97   


 


2/17/20 09:00      Nasal Cannula 2.0 


 


2/17/20 09:00  70  108/51    


 


2/17/20 08:00 98.1 70 19 108/51 (70) 92   


 


2/17/20 06:42  72 18  98 Nasal Cannula 2.0 28





  77 20  97   


 


2/17/20 06:41     97 Nasal Cannula 2.0 28


 


2/17/20 04:15 97.7 62 18 150/59 (89) 93   


 


2/17/20 00:29  68 18  98 Nasal Cannula 2.0 28





  61 16  95   


 


2/17/20 00:19 97.9 61 20 151/63 (92) 93   


 


2/16/20 21:32  60  155/71    


 


2/16/20 20:26      Nasal Cannula 2.0 


 


2/16/20 20:23 97.3 60 20 155/71 (99) 96   


 


2/16/20 19:59  68 18  99 Nasal Cannula 2.0 28





  64 20  96   


 


2/16/20 19:59     96 Nasal Cannula 2.0 28


 


2/16/20 18:38 97.5 60 17 147/67 (93) 94   


 


2/16/20 16:00 97.9 60 17 148/59 (88) 94   

















Intake and Output  


 


 2/16/20 2/17/20





 19:00 07:00


 


Intake Total  695 ml


 


Balance  695 ml


 


  


 


IV Total  695 ml


 


# Voids 1 2








General Appearance:  no acute distress, cachetic


HEENT:  normocephalic, atraumatic, anicteric, mucous membranes moist


Respiratory/Chest:  chest wall non-tender, lungs clear, normal breath sounds


Cardiovascular:  normal peripheral pulses, normal rate, regular rhythm


Abdomen:  normal bowel sounds, soft, non tender, no organomegaly, no mass


Extremities:  no cyanosis, no clubbing, no edema





Current Medications








 Medications


  (Trade)  Dose


 Ordered  Sig/Fito


 Route


 PRN Reason  Start Time


 Stop Time Status Last Admin


Dose Admin


 


 Aspirin


  (ASA)  81 mg  DAILY


 ORAL


   2/17/20 09:00


 3/14/20 08:59   


 


 


 Atorvastatin


 Calcium


  (Lipitor)  20 mg  BEDTIME


 ORAL


   2/16/20 21:00


 3/13/20 20:59  2/16/20 21:32


 


 


 Cefepime HCl 2 gm/


 Dextrose  110 ml @ 


 220 mls/hr  Q8HR


 IV


   2/16/20 22:00


 2/22/20 11:29  2/17/20 05:00


 


 


 Clopidogrel


 Bisulfate


  (Plavix)  75 mg  DAILY


 ORAL


   2/17/20 09:00


 3/14/20 08:59   


 


 


 Dextrose


  (Dextrose 50%)  25 ml  Q30M  PRN


 IV


 Hypoglycemia  2/16/20 18:15


 3/13/20 08:44   


 


 


 Dextrose


  (Dextrose 50%)  50 ml  Q30M  PRN


 IV


 Hypoglycemia  2/16/20 18:15


 3/13/20 08:44   


 


 


 Docusate Sodium


  (Colace)  100 mg  THREE TIMES A  DAY


 ORAL


   2/17/20 09:00


 3/13/20 12:59   


 


 


 Donepezil HCl


  (Aricept)  10 mg  DAILY


 ORAL


   2/17/20 09:00


 3/14/20 08:59   


 


 


 Heparin Sodium


  (Porcine)


  (Heparin 5000


 units/ml)  5,000 units  EVERY 12  HOURS


 SUBQ


   2/16/20 21:00


 3/13/20 08:59  2/17/20 09:54


 


 


 Insulin Aspart


  (NovoLOG)    BEFORE MEALS AND  HS


 SUBQ


   2/16/20 21:00


 3/13/20 11:29  2/16/20 21:34


 


 


 Ipratropium


 Bromide


  (Atrovent)  500 mcg  Q6H  PRN


 HHN


 Shortness of Breath  2/16/20 18:15


 2/17/20 18:14   


 


 


 Lidocaine


  (Lidoderm 5%


 PATCH)  1 patch  DAILY


 TDERMAL


   2/17/20 09:00


 3/14/20 08:59  2/17/20 09:52


 


 


 Lorazepam


  (Ativan 2mg/ml


 1ml)  0.25 mg  Q12H  PRN


 IV


 Agitation  2/16/20 18:15


 2/21/20 18:14   


 


 


 Metoprolol


 Tartrate


  (Lopressor)  25 mg  EVERY 12  HOURS


 ORAL


   2/16/20 21:00


 3/13/20 20:59  2/16/20 21:32


 


 


 Sodium Chloride  1,000 ml @ 


 50 mls/hr  Q20H


 IV


   2/16/20 18:15


 3/13/20 11:59  2/16/20 18:43


 


 


 Tamsulosin HCl


  (Flomax)  0.4 mg  DAILY


 ORAL


   2/17/20 09:00


 3/14/20 08:59   


 

















Mika Aguirre MD Feb 17, 2020 13:40

## 2020-02-17 NOTE — NUR
NURSE NOTES:

Iv access and restraints discontinued. Report given to ambulance personnel. Pt has 1 gray 
shirt as belongings and given to ambulance personnel.

## 2020-02-17 NOTE — CARDIAC ELECTROPHYSIOLOGY PN
Assessment/Plan


Assessment/Plan


1. NSTEMI type 2 . Due to demand ischemia in view of


patient's sepsis, white count of 20,000 and renal


failure with creatinine of 2.8.    EF 55%. LE duplex no DVT


On aspirin, Lopressor 25 bid, and Lipitor 40. Hx of CABG.  





2. Status post St You pacemaker.  Interrogated and showed Nl Fx. 


     Battery more than a year left


3. Sepsis, COPD, pneumonia and hypoxia.  On Zosyn per ID


   DDimer was positive but VQ low probability 2/14/20


4. Advanced dementia.


5. Dysphagia,  passed Swallow eval but still refusing eating.  





DW RN





Subjective


Subjective


Passed swallow eval. V pacing when on tele.  Off tele now


DDimer was high and had VQ scan that was low probability 2/14/20





Objective





Last 24 Hour Vital Signs








  Date Time  Temp Pulse Resp B/P (MAP) Pulse Ox O2 Delivery O2 Flow Rate FiO2


 


2/17/20 12:00 98.1  16 121/47 (71) 97   


 


2/17/20 09:00      Nasal Cannula 2.0 


 


2/17/20 09:00  70  108/51    


 


2/17/20 08:00 98.1 70 19 108/51 (70) 92   


 


2/17/20 06:42  72 18  98 Nasal Cannula 2.0 28





  77 20  97   


 


2/17/20 06:41     97 Nasal Cannula 2.0 28


 


2/17/20 04:15 97.7 62 18 150/59 (89) 93   


 


2/17/20 00:29  68 18  98 Nasal Cannula 2.0 28





  61 16  95   


 


2/17/20 00:19 97.9 61 20 151/63 (92) 93   


 


2/16/20 21:32  60  155/71    


 


2/16/20 20:26      Nasal Cannula 2.0 


 


2/16/20 20:23 97.3 60 20 155/71 (99) 96   


 


2/16/20 19:59  68 18  99 Nasal Cannula 2.0 28





  64 20  96   


 


2/16/20 19:59     96 Nasal Cannula 2.0 28


 


2/16/20 18:38 97.5 60 17 147/67 (93) 94   


 


2/16/20 16:00 97.9 60 17 148/59 (88) 94   

















Intake and Output  


 


 2/16/20 2/17/20





 19:00 07:00


 


Intake Total  695 ml


 


Balance  695 ml


 


  


 


IV Total  695 ml


 


# Voids 1 2








Objective


HEAD AND NECK:  No JVD.


LUNGS:  Coarse rhonchi.


CARDIOVASCULAR:  Regular S1 and S2 with no gallop or murmur.


ABDOMEN:  Soft.


EXTREMITIES:  No pitting edema.











Bob Fuentes MD Feb 17, 2020 14:42

## 2020-02-17 NOTE — GENERAL PROGRESS NOTE
Assessment/Plan


Status:  progressing, unchanged


Assessment/Plan:


1. Pneumonia - cont cefepime 2 gm IV q 8 hrs for total of 3 wks for peudomonas 

pneumonia per ID.  D/C back to [facility] today.


2. Leukocytosis improving, possible sepsis - improved. cont IV abx.


3. Elevated Troponin 2nd to NSTEMI due to demand Ischemia most likely due to 

KELVIN and sepsis - cardiology following.


4. COPD exacerbation - improved.


5. Hypotension 2nd to sepsis and pneumonia - improved.


6. Dementia - on oral feeding and oral meds now.


7. Acute Hypoxemic respiratory Failure 2nd to pneumonia and sepsis


8. JOAQUIN - improving with hydration.


9. Hypertensive heart disease - on metoprolol 25 mg po bid.


10. HLD - cont oral meds.


11. DM II - start SSI.


12. CAD s/p CABG and stent placement.


13. H/O PVD


14. H/O angioedema in the past.





Subjective


Date patient seen:  Feb 17, 2020


Time patient seen:  08:40


Constitutional:  Reports: no symptoms


HEENT:  Reports: no symptoms


Cardiovascular:  Reports: no symptoms


Respiratory:  Reports: no symptoms


Gastrointestinal/Abdominal:  Reports: no symptoms


Genitourinary:  Reports: no symptoms


Neurologic/Psychiatric:  Reports: no symptoms


Endocrine:  Reports: no symptoms


Hematologic/Lymphatic:  Reports: no symptoms


Allergies:  


Coded Allergies:  


     ACETAMINOPHEN (Verified  Allergy, Unknown, 2/12/20)


     HYDROCODONE (Verified  Allergy, Unknown, 2/12/20)


Subjective


This morning he is awake and doing better.  


no sob or chest pain.  no fever or chills.





Objective





Last 24 Hour Vital Signs








  Date Time  Temp Pulse Resp B/P (MAP) Pulse Ox O2 Delivery O2 Flow Rate FiO2


 


2/17/20 08:00 98.1 70 19 108/51 (70) 92   


 


2/17/20 06:42  72 18  98 Nasal Cannula 2.0 28





  77 20  97   


 


2/17/20 06:41     97 Nasal Cannula 2.0 28


 


2/17/20 04:15 97.7 62 18 150/59 (89) 93   


 


2/17/20 00:29  68 18  98 Nasal Cannula 2.0 28





  61 16  95   


 


2/17/20 00:19 97.9 61 20 151/63 (92) 93   


 


2/16/20 21:32  60  155/71    


 


2/16/20 20:26      Nasal Cannula 2.0 


 


2/16/20 20:23 97.3 60 20 155/71 (99) 96   


 


2/16/20 19:59  68 18  99 Nasal Cannula 2.0 28





  64 20  96   


 


2/16/20 19:59     96 Nasal Cannula 2.0 28


 


2/16/20 18:38 97.5 60 17 147/67 (93) 94   


 


2/16/20 16:00 97.9 60 17 148/59 (88) 94   


 


2/16/20 13:05  71 18  99 Nasal Cannula 2.0 28





  68 18  97   


 


2/16/20 12:00 98.9 65 19 140/53 (82) 95   


 


2/16/20 09:06  76  139/53    


 


2/16/20 09:00      Nasal Cannula 2.0 

















Intake and Output  


 


 2/16/20 2/17/20





 19:00 07:00


 


Intake Total  695 ml


 


Balance  695 ml


 


  


 


IV Total  695 ml


 


# Voids 1 2








Height (Feet):  5


Height (Inches):  11.00


Weight (Pounds):  200


General Appearance:  no apparent distress, alert


EENT:  normal ENT inspection


Neck:  non-tender, supple


Cardiovascular:  normal rate, regular rhythm


Respiratory/Chest:  lungs clear, normal breath sounds


Abdomen:  non tender, soft


Extremities:  normal range of motion, non-tender


Edema:  no edema noted Arm (L), no edema noted Arm (R), no edema noted Leg (L), 

no edema noted Leg (R), no edema noted Pedal (L), no edema noted Pedal (R), no 

edema noted Generalized


Neurologic:  alert, responsive


Skin:  warm/dry











Kandy Hardwick MD Feb 17, 2020 08:55

## 2020-02-17 NOTE — GENERAL PROGRESS NOTE
Assessment/Plan


Problem List:  


(1) Elevated WBC count


ICD Codes:  D72.829 - Elevated white blood cell count, unspecified


SNOMED:  317300632, 318474522


(2) JOAQUIN (acute kidney injury)


ICD Codes:  N17.9 - Acute kidney failure, unspecified


SNOMED:  13563133, 3276897


(3) Respiratory failure with hypoxia


ICD Codes:  J96.91 - Respiratory failure, unspecified with hypoxia


SNOMED:  32828019866676887


(4) COPD (chronic obstructive pulmonary disease)


ICD Codes:  J44.9 - Chronic obstructive pulmonary disease, unspecified


SNOMED:  69377951


(5) Leukocytosis


ICD Codes:  D72.829 - Elevated white blood cell count, unspecified


SNOMED:  973052556, 938725817


(6) Dysphasia


ICD Codes:  R47.02 - Dysphasia


SNOMED:  14126831


(7) Failure to thrive


SNOMED:  21418780


(8) Basal pneumonia of both lungs


ICD Codes:  J18.9 - Pneumonia, unspecified organism


SNOMED:  502700586


(9) JOAQUIN (acute kidney injury)


ICD Codes:  N17.9 - Acute kidney failure, unspecified


SNOMED:  9741528, 04798277


Status:  progressing, unchanged


Assessment/Plan:


eating better


hold NGT placement and feeding for now


patient is being discharged to rehab


needs out patient fu





Subjective


ROS Limited/Unobtainable:  Yes


Allergies:  


Coded Allergies:  


     ACETAMINOPHEN (Verified  Allergy, Unknown, 2/12/20)


     HYDROCODONE (Verified  Allergy, Unknown, 2/12/20)





Objective





Last 24 Hour Vital Signs








  Date Time  Temp Pulse Resp B/P (MAP) Pulse Ox O2 Delivery O2 Flow Rate FiO2


 


2/17/20 08:00 98.1 70 19 108/51 (70) 92   


 


2/17/20 06:42  72 18  98 Nasal Cannula 2.0 28





  77 20  97   


 


2/17/20 06:41     97 Nasal Cannula 2.0 28


 


2/17/20 04:15 97.7 62 18 150/59 (89) 93   


 


2/17/20 00:29  68 18  98 Nasal Cannula 2.0 28





  61 16  95   


 


2/17/20 00:19 97.9 61 20 151/63 (92) 93   


 


2/16/20 21:32  60  155/71    


 


2/16/20 20:26      Nasal Cannula 2.0 


 


2/16/20 20:23 97.3 60 20 155/71 (99) 96   


 


2/16/20 19:59  68 18  99 Nasal Cannula 2.0 28





  64 20  96   


 


2/16/20 19:59     96 Nasal Cannula 2.0 28


 


2/16/20 18:38 97.5 60 17 147/67 (93) 94   


 


2/16/20 16:00 97.9 60 17 148/59 (88) 94   


 


2/16/20 13:05  71 18  99 Nasal Cannula 2.0 28





  68 18  97   


 


2/16/20 12:00 98.9 65 19 140/53 (82) 95   

















Intake and Output  


 


 2/16/20 2/17/20





 19:00 07:00


 


Intake Total  695 ml


 


Balance  695 ml


 


  


 


IV Total  695 ml


 


# Voids 1 2








Height (Feet):  5


Height (Inches):  11.00


Weight (Pounds):  200


General Appearance:  alert


EENT:  normal ENT inspection


Neck:  supple


Cardiovascular:  normal rate


Respiratory/Chest:  decreased breath sounds


Abdomen:  normal bowel sounds, non tender, soft


Extremities:  non-tender











Jose Martin Damon MD Feb 17, 2020 09:10

## 2020-02-17 NOTE — NUR
NURSE NOTES:

REPORT GIVEN TO TOMASZ PERSAUD AT [facility], AND MADE AWARE OF IV CEFEPIME 2GM Q8H X 19 
DAYS. LAST DOSE WAS AT 1400HRS AND NEXT DOSE SCHEDULED FOR 2200HRS. RN INFORMED ARIC GUNTER 
(SON) REGARDING DISCHARGE BACK TO [facility] AND AGREES WITH DISCHARGE.

## 2020-02-17 NOTE — NUR
NURSE NOTES:

PT AXOX1, RESTING IN BED. RN RECEIVED REPORT PT IS HIGH FALL RISK DUE TO PREVIOUS FALL 
DURING HOSPITALIZATION. PT IS RESTLESS. BED IN LOWEST POSITION WITH BEDSIDE RAILS X3 RAISED. 
BED ALARM ON ZONE 2. IN HIGH AGUILAR'S POSITION. PT HAS BILATERAL SOFT WRIST RESTRAINTS. 
BILATERAL WRISTS SKIN INTACT, NO SWELLING NOTED. PT APPEARS CALM AFTER A FEW MINUTES OF 
RESTLESSNESS. PT IS AXOX1 AND UNABLE TO COMPREHEND RN'S FALL RISK EDUCATION. YELLOW FALL 
RISK BAND APPLIED. WILL CONTINUE TO MONITOR.

## 2020-02-18 NOTE — DISCHARGE SUMMARY
Discharge Summary


Discharge Summary


_


DATE OF ADMISSION: 2/12/2020





DATE OF DISCHARGE: 2/17/2020








DISCHARGED BY: Dr. Hardwick





REASON FOR ADMISSION: 


89 years old male with past medical history of severe dementia, COPD, chronic 

kidney disease, history of permanent pacemaker placement resident of skilled 

nursing facility but sent for evaluation due to hypoxia.  Oxygen saturation was 

in the low 80s.  Nursing facility also reported intermittent cough.  No fever 

or chills are reported.  Patient nonverbal at baseline with severe dementia 

unable to provide any information.  Per paramedics patient was somnolent in 

route.  Patient received 1 breathing treatment with bronchodilator without much 

improvement.  Patient started on supplemental oxygen and admitted and presented 

to emergency department for further management.  Upon evaluation patient was 

tachypneic Pulsoxymeter was 83% on room air.  Laboratory work-up revealed 

leukocytosis hemoglobin 10.2 hematocrit 32.3.  ABG revealed and supplemental 

oxygen were stable.


BUN 29, creatinine 2.8.  Glucose 127.  proBNP 2241.  Albumin 2.3.  Troponin 0 

0.066.  Urinalysis revealed evidence of urinary tract infection +2 protein.  

Chest x-ray demonstrated evidence of possible posterior left lateral 

hemidiaphragm, parenchymal and or pleural disease of the left lung base no 

acute process otherwise.  Sclerotic lesion of the proximal humerus, most likely 

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  





Patient was admitted for COPD exacerbation respiratory failure possible sepsis.


 


CONSULTANTS:


cardiologist Dr. Anderson


pulmonary Dr. Aguirre 


ID specialist Dr. Alex 


GI specialist Dr. Damon


 


 


[facility] COURSE: 


Patient initially admitted to telemetry floor.  


Supplemental oxygen provided and titrated to keep oximetry above 90%.  


Patient initially was on nonrebreathing mask  in ED, then downgraded  to simple 

mask when   transferred to the floor and then was able to be weaned down to 

nasal cannula.  


Nebulizing treatment with bronchodilator around-the-clock and as needed 

provided.  


Patient started on the IV steroids with gradual tapering and empiric 

antibiotic.  


Volumes  and renal parameters were closely monitored.  


Aspiration precaution maintained. 


Patient initially was n.p.o. 


Swallow evaluation was done when  patient  became  more alert.  


DVT prophylaxis provided.





CT of the chest revealed bilateral basilar infiltrates and atelectasis.  


Antibiotic provided  as per ID specialist recommendation. 


Sputum culture revealed Pseudomonas aeruginosa.   


ID specialist recommended continue antibiotics at the facility to complete the 

course.  End of treatment 3/7/2020. 


Blood cultures were negative.  


Influenza swab was negative.


Noted elevated D dimer. 


Venous duplex bilateral lower extremity revealed no evidence of acute DVT.  


VQ scan revealed  low probability for pulmonary emboli.  








Serial troponin were closely monitored;  second troponin   0.086 , and  the 

last troponin   0.082. 


Echocardiogram demonstrated preserved ejection fraction of 50 to 55% with no 

evidence of left ventricular hypertrophy.  


No evidence of wall motion abnormality to the extent visualized.  


Right ventricular systolic pressure of 30.  


Lipid panel was stable.  


Per cardiologist , patient had NSTEMI type II due to demand ischemia in view of 

patient's sepsis and renal failure.  


Patient   was continued  on antiplatelet therapy with aspirin , statin and beta-

blocker.  


Pacemaker interrogated and showed normal functioning.  Battery left for more 

than a year.  








Patient passed swallow evaluation,  but refused to eat.  


GI specialist recommended oral diet with texture as recommended by speech 

therapist with aspiration precautions,  as tolerated .


Patient may need a tube feeding. 


Hemoglobin  and hematocrit were closely monitored with goal to keep hemoglobin 

above 7.  


Prior to discharge hemoglobin 9.4 hematocrit 27.7.





Renal parameters  and electrolytes were closely monitored.  


Electrolytes corrected as needed and nephrotoxins were avoided. 


Creatinine from 2.8 down to 1.1 and BUN from 29 down to 22.  


Acute kidney injury was likely due to  dehydration and resolved.





IV steroids tapered and stopped.  


Leukocytosis resolved.  No fevers.


Pulse oximetry on 2 L of oxygen prior to discharge 98%.


Patient clinically stabilized and was ready for discharge to skilled nursing 

facility.  


Patient to continue antibiotic to complete the course , as outlined in 

medication reconciliation list.





FINAL DIAGNOSES: 


Acute hypoxemic respiratory failure secondary to pneumonia


Probably  sepsis


Healthcare associated pneumonia with  Pseudomonas aeruginosa


Elevated troponin


Acute on chronic kidney disease


Dehydration


COPD with acute exacerbation


NSTEMI type II due to demand ischemia in view of patient's sepsis and renal 

failure


Status post [employer] pacemaker


Hypertension


Hypertensive heart disease


Hyperlipidemia


Diabetes mellitus type 2


Coronary artery disease with history of CABG and PCI


History of Mobitz type II heart block and sick sinus syndrome status post 

pacemaker


Peripheral vascular disease


History of angioedema


Advanced dementia


Dysphagia


Anemia





DISCHARGE MEDICATIONS:


See Medication Reconciliation list.





DISCHARGE INSTRUCTIONS:


Patient was discharged to the skilled nursing facility. 


Follow up with medical doctor at the facility.











I have been assigned to dictate discharge summary for this account. 


I was not involved in the patient's management.











Charlotte Sepulveda NP Feb 18, 2020 12:44

## 2022-08-17 NOTE — CARDIAC ELECTROPHYSIOLOGY PN
Assessment/Plan


Assessment/Plan


1. NSTEMI type 2 . Due to demand ischemia in view of


patient's sepsis, white count of 20,000 and renal


failure with creatinine of 2.8.  Echo  showed Nl EF 55%. LE duplex no DVT


On aspirin, Lopressor 25 bid, and Lipitor 40. Hx of CABG.  





2. Status post [employer] pacemaker.  Interrogated and showed Nl Fx. 


     Battery more than a year left


3. Sepsis, COPD, pneumonia and hypoxia.  On Zosyn per ID


   DDimer was positive but VQ low probability 2/14/20


4. Advanced dementia.


5. Dysphagia,  passed Swallow eval but still refusing eating. Food Pocketing





DW RN and Son





Subjective


Subjective


Family took off the restraints and he fell but X Rays no Fx, on restraints.  .


Passed swallow eval. V pacing on tele.  


DDimer was high and had VQ scan that was low probability 2/14/20





Objective





Last 24 Hour Vital Signs








  Date Time  Temp Pulse Resp B/P (MAP) Pulse Ox O2 Delivery O2 Flow Rate FiO2


 


2/16/20 09:06  76  139/53    


 


2/16/20 06:54  73 18  99 Nasal Cannula 2.0 28





  71 18  99   


 


2/16/20 06:54     99 Nasal Cannula 2.0 28


 


2/16/20 04:00  85      


 


2/16/20 04:00 98.0 62 19 149/92 (111) 96   


 


2/16/20 00:16  76 20  98 Nasal Cannula 2.0 28





  72 20  93   


 


2/16/20 00:00  68      


 


2/16/20 00:00 98.1 66 18 147/97 (114) 95   


 


2/15/20 21:44  64  139/66    


 


2/15/20 21:00      Nasal Cannula 2.0 


 


2/15/20 20:00 97.9 64 19 139/66 (90) 97   


 


2/15/20 20:00  65      


 


2/15/20 19:19  68 22  100 Nasal Cannula 2.0 28





  63 22  97   


 


2/15/20 19:15     97 Nasal Cannula 2.0 28


 


2/15/20 16:22  72      


 


2/15/20 16:12 97.0 76 20 143/63 (89) 97   


 


2/15/20 13:23  80 24  100 Nasal Cannula 2.0 28





  74 20  95   


 


2/15/20 12:00 97.2 60 20 142/61 (88) 97   


 


2/15/20 11:46  84      


 


2/15/20 09:37  95  111/64    

















Intake and Output  


 


 2/15/20 2/16/20





 19:00 07:00


 


Intake Total 1000 ml 598 ml


 


Output Total 1000 ml 1600 ml


 


Balance 0 ml -1002 ml


 


  


 


Intake Oral 240 ml 


 


IV Total 760 ml 598 ml


 


Output Urine Total 1000 ml 1600 ml


 


# Bowel Movements  1











Laboratory Tests








Test


  2/16/20


06:25


 


White Blood Count


  8.5 K/UL


(4.8-10.8)


 


Red Blood Count


  2.99 M/UL


(4.70-6.10)  L


 


Hemoglobin


  9.4 G/DL


(14.2-18.0)  L


 


Hematocrit


  27.7 %


(42.0-52.0)  L


 


Mean Corpuscular Volume 93 FL (80-99)  


 


Mean Corpuscular Hemoglobin


  31.5 PG


(27.0-31.0)  H


 


Mean Corpuscular Hemoglobin


Concent 34.0 G/DL


(32.0-36.0)


 


Red Cell Distribution Width


  16.3 %


(11.6-14.8)  H


 


Platelet Count


  235 K/UL


(150-450)


 


Mean Platelet Volume


  5.1 FL


(6.5-10.1)  L


 


Neutrophils (%) (Auto)


  75.5 %


(45.0-75.0)  H


 


Lymphocytes (%) (Auto)


  14.1 %


(20.0-45.0)  L


 


Monocytes (%) (Auto)


  9.9 %


(1.0-10.0)


 


Eosinophils (%) (Auto)


  0.1 %


(0.0-3.0)


 


Basophils (%) (Auto)


  0.4 %


(0.0-2.0)


 


Sodium Level


  145 MMOL/L


(136-145)


 


Potassium Level


  3.8 MMOL/L


(3.5-5.1)


 


Chloride Level


  110 MMOL/L


()  H


 


Carbon Dioxide Level


  27 MMOL/L


(21-32)


 


Anion Gap


  8 mmol/L


(5-15)


 


Blood Urea Nitrogen


  22 mg/dL


(7-18)  H


 


Creatinine


  1.1 MG/DL


(0.55-1.30)


 


Estimat Glomerular Filtration


Rate > 60 mL/min


(>60)


 


Glucose Level


  120 MG/DL


()  H


 


Calcium Level


  8.6 MG/DL


(8.5-10.1)








Objective


HEAD AND NECK:  No JVD.


LUNGS:  Coarse rhonchi.


CARDIOVASCULAR:  Regular S1 and S2 with no gallop or murmur.


ABDOMEN:  Soft.


EXTREMITIES:  No pitting edema.











Bob Fuentes MD Feb 16, 2020 09:26 Consent (Near Eyelid Margin)/Introductory Paragraph: The rationale for Mohs was explained to the patient and consent was obtained. The risks, benefits and alternatives to therapy were discussed in detail. Specifically, the risks of ectropion or eyelid deformity, infection, scarring, bleeding, prolonged wound healing, incomplete removal, allergy to anesthesia, nerve injury and recurrence were addressed. Prior to the procedure, the treatment site was clearly identified and confirmed by the patient using a hand mirror. All components of Universal Protocol/PAUSE Rule completed.